# Patient Record
Sex: FEMALE | Race: BLACK OR AFRICAN AMERICAN | NOT HISPANIC OR LATINO | URBAN - METROPOLITAN AREA
[De-identification: names, ages, dates, MRNs, and addresses within clinical notes are randomized per-mention and may not be internally consistent; named-entity substitution may affect disease eponyms.]

---

## 2024-09-30 ENCOUNTER — EMERGENCY (EMERGENCY)
Facility: HOSPITAL | Age: 84
LOS: 1 days | Discharge: HOME | End: 2024-09-30
Attending: EMERGENCY MEDICINE
Payer: MEDICARE

## 2024-09-30 VITALS
OXYGEN SATURATION: 97 % | TEMPERATURE: 98 F | SYSTOLIC BLOOD PRESSURE: 213 MMHG | HEIGHT: 62 IN | WEIGHT: 125 LBS | HEART RATE: 91 BPM | DIASTOLIC BLOOD PRESSURE: 66 MMHG | RESPIRATION RATE: 18 BRPM

## 2024-09-30 PROCEDURE — 99285 EMERGENCY DEPT VISIT HI MDM: CPT

## 2024-09-30 PROCEDURE — 93010 ELECTROCARDIOGRAM REPORT: CPT

## 2024-09-30 PROCEDURE — 71045 X-RAY EXAM CHEST 1 VIEW: CPT | Mod: 26

## 2024-09-30 RX ORDER — HYDRALAZINE HCL 50 MG
50 TABLET ORAL ONCE
Refills: 0 | Status: COMPLETED | OUTPATIENT
Start: 2024-09-30 | End: 2024-09-30

## 2024-09-30 RX ADMIN — Medication 50 MILLIGRAM(S): at 23:54

## 2024-09-30 NOTE — ED ADULT TRIAGE NOTE - CHIEF COMPLAINT QUOTE
Pt reports that  her B/P has been high x1 week . she was in the ED last  week and started taking antihypertensives .  Today B/P high  again

## 2024-10-01 VITALS
OXYGEN SATURATION: 98 % | HEART RATE: 88 BPM | RESPIRATION RATE: 17 BRPM | TEMPERATURE: 98 F | SYSTOLIC BLOOD PRESSURE: 160 MMHG | DIASTOLIC BLOOD PRESSURE: 65 MMHG

## 2024-10-01 LAB
ALBUMIN SERPL ELPH-MCNC: 3.4 G/DL — LOW (ref 3.5–5)
ALP SERPL-CCNC: 119 U/L — SIGNIFICANT CHANGE UP (ref 40–120)
ALT FLD-CCNC: 30 U/L DA — SIGNIFICANT CHANGE UP (ref 10–60)
ANION GAP SERPL CALC-SCNC: 8 MMOL/L — SIGNIFICANT CHANGE UP (ref 5–17)
APPEARANCE UR: CLEAR — SIGNIFICANT CHANGE UP
AST SERPL-CCNC: 28 U/L — SIGNIFICANT CHANGE UP (ref 10–40)
BASOPHILS # BLD AUTO: 0.02 K/UL — SIGNIFICANT CHANGE UP (ref 0–0.2)
BASOPHILS NFR BLD AUTO: 0.3 % — SIGNIFICANT CHANGE UP (ref 0–2)
BILIRUB SERPL-MCNC: 0.4 MG/DL — SIGNIFICANT CHANGE UP (ref 0.2–1.2)
BILIRUB UR-MCNC: NEGATIVE — SIGNIFICANT CHANGE UP
BUN SERPL-MCNC: 22 MG/DL — HIGH (ref 7–18)
CALCIUM SERPL-MCNC: 8.3 MG/DL — LOW (ref 8.4–10.5)
CHLORIDE SERPL-SCNC: 112 MMOL/L — HIGH (ref 96–108)
CO2 SERPL-SCNC: 23 MMOL/L — SIGNIFICANT CHANGE UP (ref 22–31)
COLOR SPEC: YELLOW — SIGNIFICANT CHANGE UP
CREAT SERPL-MCNC: 1.58 MG/DL — HIGH (ref 0.5–1.3)
DIFF PNL FLD: NEGATIVE — SIGNIFICANT CHANGE UP
EGFR: 32 ML/MIN/1.73M2 — LOW
EOSINOPHIL # BLD AUTO: 0.11 K/UL — SIGNIFICANT CHANGE UP (ref 0–0.5)
EOSINOPHIL NFR BLD AUTO: 1.8 % — SIGNIFICANT CHANGE UP (ref 0–6)
GLUCOSE SERPL-MCNC: 111 MG/DL — HIGH (ref 70–99)
GLUCOSE UR QL: NEGATIVE MG/DL — SIGNIFICANT CHANGE UP
HCT VFR BLD CALC: 36.9 % — SIGNIFICANT CHANGE UP (ref 34.5–45)
HGB BLD-MCNC: 11.6 G/DL — SIGNIFICANT CHANGE UP (ref 11.5–15.5)
IMM GRANULOCYTES NFR BLD AUTO: 0 % — SIGNIFICANT CHANGE UP (ref 0–0.9)
KETONES UR-MCNC: NEGATIVE MG/DL — SIGNIFICANT CHANGE UP
LEUKOCYTE ESTERASE UR-ACNC: ABNORMAL
LYMPHOCYTES # BLD AUTO: 1.63 K/UL — SIGNIFICANT CHANGE UP (ref 1–3.3)
LYMPHOCYTES # BLD AUTO: 27.3 % — SIGNIFICANT CHANGE UP (ref 13–44)
MCHC RBC-ENTMCNC: 27.2 PG — SIGNIFICANT CHANGE UP (ref 27–34)
MCHC RBC-ENTMCNC: 31.4 GM/DL — LOW (ref 32–36)
MCV RBC AUTO: 86.6 FL — SIGNIFICANT CHANGE UP (ref 80–100)
MONOCYTES # BLD AUTO: 0.6 K/UL — SIGNIFICANT CHANGE UP (ref 0–0.9)
MONOCYTES NFR BLD AUTO: 10.1 % — SIGNIFICANT CHANGE UP (ref 2–14)
NEUTROPHILS # BLD AUTO: 3.61 K/UL — SIGNIFICANT CHANGE UP (ref 1.8–7.4)
NEUTROPHILS NFR BLD AUTO: 60.5 % — SIGNIFICANT CHANGE UP (ref 43–77)
NITRITE UR-MCNC: NEGATIVE — SIGNIFICANT CHANGE UP
NRBC # BLD: 0 /100 WBCS — SIGNIFICANT CHANGE UP (ref 0–0)
PH UR: 5.5 — SIGNIFICANT CHANGE UP (ref 5–8)
PLATELET # BLD AUTO: 266 K/UL — SIGNIFICANT CHANGE UP (ref 150–400)
POTASSIUM SERPL-MCNC: 4.7 MMOL/L — SIGNIFICANT CHANGE UP (ref 3.5–5.3)
POTASSIUM SERPL-SCNC: 4.7 MMOL/L — SIGNIFICANT CHANGE UP (ref 3.5–5.3)
PROT SERPL-MCNC: 6.8 G/DL — SIGNIFICANT CHANGE UP (ref 6–8.3)
PROT UR-MCNC: NEGATIVE MG/DL — SIGNIFICANT CHANGE UP
RBC # BLD: 4.26 M/UL — SIGNIFICANT CHANGE UP (ref 3.8–5.2)
RBC # FLD: 13.6 % — SIGNIFICANT CHANGE UP (ref 10.3–14.5)
SODIUM SERPL-SCNC: 143 MMOL/L — SIGNIFICANT CHANGE UP (ref 135–145)
SP GR SPEC: 1.01 — SIGNIFICANT CHANGE UP (ref 1–1.03)
TROPONIN I, HIGH SENSITIVITY RESULT: 22.9 NG/L — SIGNIFICANT CHANGE UP
UROBILINOGEN FLD QL: 0.2 MG/DL — SIGNIFICANT CHANGE UP (ref 0.2–1)
WBC # BLD: 5.97 K/UL — SIGNIFICANT CHANGE UP (ref 3.8–10.5)
WBC # FLD AUTO: 5.97 K/UL — SIGNIFICANT CHANGE UP (ref 3.8–10.5)

## 2024-10-01 PROCEDURE — 80053 COMPREHEN METABOLIC PANEL: CPT

## 2024-10-01 PROCEDURE — 85025 COMPLETE CBC W/AUTO DIFF WBC: CPT

## 2024-10-01 PROCEDURE — 81001 URINALYSIS AUTO W/SCOPE: CPT

## 2024-10-01 PROCEDURE — 87086 URINE CULTURE/COLONY COUNT: CPT

## 2024-10-01 PROCEDURE — 99285 EMERGENCY DEPT VISIT HI MDM: CPT | Mod: 25

## 2024-10-01 PROCEDURE — 71045 X-RAY EXAM CHEST 1 VIEW: CPT

## 2024-10-01 PROCEDURE — 36415 COLL VENOUS BLD VENIPUNCTURE: CPT

## 2024-10-01 PROCEDURE — 84484 ASSAY OF TROPONIN QUANT: CPT

## 2024-10-01 PROCEDURE — 93005 ELECTROCARDIOGRAM TRACING: CPT

## 2024-10-01 NOTE — ED PROVIDER NOTE - NSFOLLOWUPCLINICS_GEN_ALL_ED_FT
[facility]  Internal Medicine  [address] [place] [address]  Phone: [phone]  Fax: [phone]

## 2024-10-01 NOTE — ED PROVIDER NOTE - NSFOLLOWUPINSTRUCTIONS_ED_ALL_ED_FT
Hypertension is high blood pressure. Your blood pressure is the force of your blood moving against the walls of your arteries. Hypertension causes your blood pressure to get so high that your heart has to work much harder than normal. This can damage your heart. Hypertension that does not respond to medicines and lifestyle changes is called resistant hypertension. Hypertension is considered chronic when it continues for 3 months or longer.    Signs and symptoms of hypertension: You may have no signs or symptoms, or you may have any of the following:    Headache    Blurred vision    Chest pain    Dizziness or weakness    Trouble breathing    Nosebleeds  Call your local emergency number (911 in the ) or have someone call if:    You have chest pain.    You have any of the following signs of a heart attack:  Squeezing, pressure, or pain in your chest    You may also have any of the following:  Discomfort or pain in your back, neck, jaw, stomach, or arm    Shortness of breath    Nausea or vomiting    Lightheadedness or a sudden cold sweat    You become confused or have trouble speaking.    You suddenly feel lightheaded or have trouble breathing.  Seek care immediately if:    You have a severe headache or vision loss.    You have weakness in an arm or leg.  Call your doctor or cardiologist if:    You feel faint, dizzy, confused, or drowsy.    You have been taking your blood pressure medicine but your pressure is higher than your provider says it should be.    You have questions or concerns about your condition or care.  What you need to know about the stages of hypertension: Your healthcare provider will give you a blood pressure goal based on your age, health, and risk for cardiovascular disease. The following are general guidelines on the stages of hypertension:    Normal blood pressure is 119/79 or lower. Your healthcare provider may only check your blood pressure each year if it stays at a normal level.    Elevated blood pressure is 120/79 to 129/79. This is sometimes called prehypertension. Your healthcare provider may suggest lifestyle changes to help lower your blood pressure to a normal level. He or she may then check it again in 3 to 6 months.    Stage 1 hypertension is 130/80 to 139/89. Your provider may recommend lifestyle changes, medication, and checks every 3 to 6 months until your blood pressure is controlled.    Stage 2 hypertension is 140/90 or higher. Your provider will recommend lifestyle changes and have you take 2 kinds of hypertension medicines. You will also need to have your blood pressure checked monthly until it is controlled.  Blood Pressure Readings  Treatment may include any of the following:    Antihypertensives may be used to help lower your blood pressure. Several kinds of medicines are available. Your healthcare provider will choose medicines based on the kind of hypertension you have. You may need more than one type of medicine. Take the medicine exactly as directed.    Diuretics help decrease extra fluid that collects in your body. This will help lower your blood pressure. You may urinate more often while you take this medicine.    Cholesterol medicine helps lower your cholesterol level. A low cholesterol level helps prevent heart disease and makes it easier to control your blood pressure.  Manage hypertension:    Check your blood pressure at home. Do not smoke, have caffeine, or exercise for at least 30 minutes before you check your blood pressure. Sit and rest for 5 minutes before you check your blood pressure. Extend your arm and support it on a flat surface. Your arm should be at the same level as your heart. Follow the directions that came with your blood pressure monitor. Check your blood pressure 2 times, 1 minute apart, before you take your medicine in the morning. Also check your blood pressure before your evening meal. Keep a record of your readings and bring it to your follow-up visits. Ask your healthcare provider what your blood pressure should be.  How to take a Blood Pressure      Manage any other health conditions you have. Health conditions such as diabetes can increase your risk for hypertension. Follow your healthcare provider's instructions and take all your medicines as directed.    Ask about all medicines. Certain medicines can increase your blood pressure. Examples include oral birth control pills, decongestants, herbal supplements, and NSAIDs, such as ibuprofen. Your healthcare provider can tell you which medicines are safe for you to take. This includes prescription and over-the-counter medicines.  Lifestyle changes you can make to manage hypertension: Your healthcare provider may recommend you work with a team to manage hypertension. The team may include medical experts such as a dietitian, an exercise or physical therapist, and a behavior therapist. Your family members may be included in helping you create lifestyle changes.  Ways to Lower Your Blood Pressure    Limit sodium (salt) as directed. Too much sodium can affect your fluid balance. Check labels to find low-sodium or no-salt-added foods. Some low-sodium foods use potassium salts for flavor. Too much potassium can also cause health problems. Your healthcare provider will tell you how much sodium and potassium are safe for you to have in a day. He or she may recommend that you limit sodium to 2,300 mg a day.        Follow the meal plan recommended by your healthcare provider. A dietitian or your provider can give you more information on low-sodium plans or the DASH (Dietary Approaches to Stop Hypertension) eating plan. The DASH plan is low in sodium, processed sugar, unhealthy fats, and total fat. It is high in potassium, calcium, and fiber. These can be found in vegetables, fruit, and whole-grain foods.        Be physically active throughout the day. Physical activity, such as exercise, can help control your blood pressure and your weight. Be physically active for at least 30 minutes per day, on most days of the week. Include aerobic activity, such as walking or riding a bicycle. Also include strength training at least 2 times each week. Your healthcare providers can help you create a physical activity plan.  Ways to Be Physically Active  Strength Training for Adults      Decrease stress. This may help lower your blood pressure. Learn ways to relax, such as deep breathing or listening to music.    Limit alcohol as directed. Alcohol can increase your blood pressure. A drink of alcohol is 12 ounces of beer, 5 ounces of wine, or 1½ ounces of liquor.    Do not smoke. Nicotine and other chemicals in cigarettes and cigars can increase your blood pressure and also cause lung damage. Ask your healthcare provider for information if you currently smoke and need help to quit. E-cigarettes or smokeless tobacco still contain nicotine. Talk to your healthcare provider before you use these products.  Prevent Heart Disease   Follow up with your doctor or cardiologist as directed: You will need to return to have your blood pressure checked and to have other lab tests done. Write down your questions so you remember to ask them during your visits.

## 2024-10-01 NOTE — ED PROVIDER NOTE - PATIENT PORTAL LINK FT
You can access the FollowMyHealth Patient Portal offered by [facility] by registering at the following website: [email or web address]. By joining [facility]’s FollowMyHealth portal, you will also be able to view your health information using other applications (apps) compatible with our system.

## 2024-10-01 NOTE — ED PROVIDER NOTE - PHYSICAL EXAMINATION
No distress  Kernig and Brudzinski signs area negative, no petechiae, no photophobia, no dysarthria, no facial asymmetry, no focal deficits.   NIH stroke scale is zero. Pt passed dysphagia screen.   +1 LE edema.

## 2024-10-01 NOTE — ED PROVIDER NOTE - CLINICAL SUMMARY MEDICAL DECISION MAKING FREE TEXT BOX
84-year-old female with hypertension, triage /66.  Patient given p.o. hydralazine 50 mg.  At 1:10 AM blood pressure is currently 160/55.  Patient is asymptomatic.  Patient will continue with her prescribed antihypertensive medications and follow-up with PMD.  Pt is well appearing, has no new complaints and able to walk with normal gait. Pt is stable for discharge and follow up with medical doctor. Pt educated on care and need for follow up. Discussed anticipatory guidance and return precautions. Questions answered. I had a detailed discussion with the patient regarding the historical points, exam findings, and any diagnostic results supporting the discharge diagnosis.

## 2024-10-01 NOTE — ED PROVIDER NOTE - OBJECTIVE STATEMENT
84-year-old female patient states generalized weakness and checked her blood pressure prior to ED arrival recording 188 systolic blood pressure.  Patient denies any acute vision changes, no neck pain, no motor weakness, no LOC.  Patient's medications include: Plavix, Entresto, Ecotrin, hydralazine, nifedipine, Lipitor.  Patient states she is compliant with her antihypertensive medications and started nifedipine approximately 1 week ago.  Patient noted bilateral lower extremity mild swelling since starting nifedipine.

## 2024-10-08 ENCOUNTER — APPOINTMENT (OUTPATIENT)
Dept: INTERNAL MEDICINE | Facility: CLINIC | Age: 84
End: 2024-10-08

## 2024-10-08 ENCOUNTER — NON-APPOINTMENT (OUTPATIENT)
Age: 84
End: 2024-10-08

## 2024-10-08 VITALS
HEART RATE: 87 BPM | DIASTOLIC BLOOD PRESSURE: 53 MMHG | TEMPERATURE: 97.3 F | HEIGHT: 62 IN | WEIGHT: 132 LBS | BODY MASS INDEX: 24.29 KG/M2 | SYSTOLIC BLOOD PRESSURE: 141 MMHG

## 2024-10-08 VITALS — HEART RATE: 79 BPM | SYSTOLIC BLOOD PRESSURE: 133 MMHG | DIASTOLIC BLOOD PRESSURE: 54 MMHG

## 2024-10-08 DIAGNOSIS — Z00.00 ENCOUNTER FOR GENERAL ADULT MEDICAL EXAMINATION W/OUT ABNORMAL FINDINGS: ICD-10-CM

## 2024-10-08 DIAGNOSIS — I25.2 OLD MYOCARDIAL INFARCTION: ICD-10-CM

## 2024-10-08 DIAGNOSIS — I10 ESSENTIAL (PRIMARY) HYPERTENSION: ICD-10-CM

## 2024-10-08 DIAGNOSIS — D64.9 ANEMIA, UNSPECIFIED: ICD-10-CM

## 2024-10-08 PROCEDURE — G0438: CPT

## 2024-10-08 PROCEDURE — 99387 INIT PM E/M NEW PAT 65+ YRS: CPT | Mod: GY

## 2024-10-08 PROCEDURE — 99204 OFFICE O/P NEW MOD 45 MIN: CPT | Mod: 25

## 2024-10-08 PROCEDURE — 36415 COLL VENOUS BLD VENIPUNCTURE: CPT

## 2024-10-08 RX ORDER — NIFEDIPINE 30 MG/1
30 TABLET, EXTENDED RELEASE ORAL
Refills: 0 | Status: ACTIVE | COMMUNITY

## 2024-10-08 RX ORDER — SACUBITRIL AND VALSARTAN 49; 51 MG/1; MG/1
TABLET, FILM COATED ORAL
Refills: 0 | Status: ACTIVE | COMMUNITY

## 2024-10-08 RX ORDER — CLOZAPINE 200 MG/1
TABLET ORAL
Refills: 0 | Status: ACTIVE | COMMUNITY

## 2024-10-08 RX ORDER — HYDRALAZINE HYDROCHLORIDE 50 MG/1
50 TABLET ORAL
Refills: 0 | Status: ACTIVE | COMMUNITY

## 2024-10-09 PROBLEM — D64.9 NORMOCYTIC ANEMIA: Status: ACTIVE | Noted: 2024-10-09

## 2024-10-09 PROBLEM — I10 HYPERTENSION: Status: ACTIVE | Noted: 2024-10-09

## 2024-10-10 ENCOUNTER — NON-APPOINTMENT (OUTPATIENT)
Age: 84
End: 2024-10-10

## 2024-10-10 LAB
ALBUMIN SERPL ELPH-MCNC: 4.1 G/DL
ALP BLD-CCNC: 113 U/L
ALT SERPL-CCNC: 22 U/L
ANION GAP SERPL CALC-SCNC: 14 MMOL/L
AST SERPL-CCNC: 26 U/L
BILIRUB SERPL-MCNC: 0.4 MG/DL
BUN SERPL-MCNC: 17 MG/DL
CALCIUM SERPL-MCNC: 8.8 MG/DL
CHLORIDE SERPL-SCNC: 104 MMOL/L
CHOLEST SERPL-MCNC: 113 MG/DL
CO2 SERPL-SCNC: 23 MMOL/L
CREAT SERPL-MCNC: 1.51 MG/DL
EGFR: 34 ML/MIN/1.73M2
ESTIMATED AVERAGE GLUCOSE: 111 MG/DL
GLUCOSE SERPL-MCNC: 112 MG/DL
HBA1C MFR BLD HPLC: 5.5 %
HCT VFR BLD CALC: 33.3 %
HDLC SERPL-MCNC: 61 MG/DL
HGB BLD-MCNC: 10.5 G/DL
LDLC SERPL CALC-MCNC: 40 MG/DL
MCHC RBC-ENTMCNC: 27.1 PG
MCHC RBC-ENTMCNC: 31.5 GM/DL
MCV RBC AUTO: 86 FL
NONHDLC SERPL-MCNC: 52 MG/DL
PLATELET # BLD AUTO: 291 K/UL
POTASSIUM SERPL-SCNC: 4.1 MMOL/L
PROT SERPL-MCNC: 6.7 G/DL
RBC # BLD: 3.87 M/UL
RBC # FLD: 13.7 %
SODIUM SERPL-SCNC: 141 MMOL/L
TRIGL SERPL-MCNC: 50 MG/DL
WBC # FLD AUTO: 5.77 K/UL

## 2024-10-12 PROBLEM — E78.5 HYPERLIPIDEMIA, UNSPECIFIED: Chronic | Status: ACTIVE | Noted: 2024-10-01

## 2024-10-12 PROBLEM — I25.10 ATHEROSCLEROTIC HEART DISEASE OF NATIVE CORONARY ARTERY WITHOUT ANGINA PECTORIS: Chronic | Status: ACTIVE | Noted: 2024-10-01

## 2024-10-12 PROBLEM — I10 ESSENTIAL (PRIMARY) HYPERTENSION: Chronic | Status: ACTIVE | Noted: 2024-10-01

## 2024-10-12 LAB
FERRITIN SERPL-MCNC: 98 NG/ML
FOLATE SERPL-MCNC: 6.2 NG/ML
IRON SATN MFR SERPL: 10 %
IRON SERPL-MCNC: 32 UG/DL
TIBC SERPL-MCNC: 310 UG/DL
UIBC SERPL-MCNC: 278 UG/DL
VIT B12 SERPL-MCNC: 678 PG/ML

## 2024-10-14 ENCOUNTER — INPATIENT (INPATIENT)
Facility: HOSPITAL | Age: 84
LOS: 3 days | Discharge: HOME | DRG: 311 | End: 2024-10-18
Attending: INTERNAL MEDICINE | Admitting: INTERNAL MEDICINE
Payer: MEDICARE

## 2024-10-14 VITALS
HEIGHT: 62 IN | WEIGHT: 133.82 LBS | HEART RATE: 68 BPM | TEMPERATURE: 98 F | RESPIRATION RATE: 18 BRPM | SYSTOLIC BLOOD PRESSURE: 180 MMHG | DIASTOLIC BLOOD PRESSURE: 76 MMHG | OXYGEN SATURATION: 97 %

## 2024-10-14 LAB
ALBUMIN SERPL ELPH-MCNC: 3.1 G/DL — LOW (ref 3.5–5)
ALP SERPL-CCNC: 108 U/L — SIGNIFICANT CHANGE UP (ref 40–120)
ALT FLD-CCNC: 31 U/L DA — SIGNIFICANT CHANGE UP (ref 10–60)
ANION GAP SERPL CALC-SCNC: 4 MMOL/L — LOW (ref 5–17)
APPEARANCE UR: CLEAR — SIGNIFICANT CHANGE UP
AST SERPL-CCNC: 24 U/L — SIGNIFICANT CHANGE UP (ref 10–40)
BASOPHILS # BLD AUTO: 0.01 K/UL — SIGNIFICANT CHANGE UP (ref 0–0.2)
BASOPHILS NFR BLD AUTO: 0.2 % — SIGNIFICANT CHANGE UP (ref 0–2)
BILIRUB SERPL-MCNC: 0.4 MG/DL — SIGNIFICANT CHANGE UP (ref 0.2–1.2)
BILIRUB UR-MCNC: NEGATIVE — SIGNIFICANT CHANGE UP
BUN SERPL-MCNC: 20 MG/DL — HIGH (ref 7–18)
CALCIUM SERPL-MCNC: 8.4 MG/DL — SIGNIFICANT CHANGE UP (ref 8.4–10.5)
CHLORIDE SERPL-SCNC: 114 MMOL/L — HIGH (ref 96–108)
CK SERPL-CCNC: 121 U/L — SIGNIFICANT CHANGE UP (ref 21–215)
CO2 SERPL-SCNC: 25 MMOL/L — SIGNIFICANT CHANGE UP (ref 22–31)
COLOR SPEC: YELLOW — SIGNIFICANT CHANGE UP
CREAT SERPL-MCNC: 1.66 MG/DL — HIGH (ref 0.5–1.3)
DIFF PNL FLD: NEGATIVE — SIGNIFICANT CHANGE UP
EGFR: 30 ML/MIN/1.73M2 — LOW
EOSINOPHIL # BLD AUTO: 0.12 K/UL — SIGNIFICANT CHANGE UP (ref 0–0.5)
EOSINOPHIL NFR BLD AUTO: 2.3 % — SIGNIFICANT CHANGE UP (ref 0–6)
GLUCOSE SERPL-MCNC: 101 MG/DL — HIGH (ref 70–99)
GLUCOSE UR QL: NEGATIVE MG/DL — SIGNIFICANT CHANGE UP
HCT VFR BLD CALC: 29.4 % — LOW (ref 34.5–45)
HGB BLD-MCNC: 9.4 G/DL — LOW (ref 11.5–15.5)
IMM GRANULOCYTES NFR BLD AUTO: 0.2 % — SIGNIFICANT CHANGE UP (ref 0–0.9)
KETONES UR-MCNC: NEGATIVE MG/DL — SIGNIFICANT CHANGE UP
LEUKOCYTE ESTERASE UR-ACNC: NEGATIVE — SIGNIFICANT CHANGE UP
LYMPHOCYTES # BLD AUTO: 1.3 K/UL — SIGNIFICANT CHANGE UP (ref 1–3.3)
LYMPHOCYTES # BLD AUTO: 25.4 % — SIGNIFICANT CHANGE UP (ref 13–44)
MAGNESIUM SERPL-MCNC: 2.1 MG/DL — SIGNIFICANT CHANGE UP (ref 1.6–2.6)
MCHC RBC-ENTMCNC: 27.1 PG — SIGNIFICANT CHANGE UP (ref 27–34)
MCHC RBC-ENTMCNC: 32 GM/DL — SIGNIFICANT CHANGE UP (ref 32–36)
MCV RBC AUTO: 84.7 FL — SIGNIFICANT CHANGE UP (ref 80–100)
MONOCYTES # BLD AUTO: 0.47 K/UL — SIGNIFICANT CHANGE UP (ref 0–0.9)
MONOCYTES NFR BLD AUTO: 9.2 % — SIGNIFICANT CHANGE UP (ref 2–14)
NEUTROPHILS # BLD AUTO: 3.2 K/UL — SIGNIFICANT CHANGE UP (ref 1.8–7.4)
NEUTROPHILS NFR BLD AUTO: 62.7 % — SIGNIFICANT CHANGE UP (ref 43–77)
NITRITE UR-MCNC: NEGATIVE — SIGNIFICANT CHANGE UP
NRBC # BLD: 0 /100 WBCS — SIGNIFICANT CHANGE UP (ref 0–0)
NT-PROBNP SERPL-SCNC: 1543 PG/ML — HIGH (ref 0–450)
PH UR: 7 — SIGNIFICANT CHANGE UP (ref 5–8)
PLATELET # BLD AUTO: 262 K/UL — SIGNIFICANT CHANGE UP (ref 150–400)
POTASSIUM SERPL-MCNC: 4 MMOL/L — SIGNIFICANT CHANGE UP (ref 3.5–5.3)
POTASSIUM SERPL-SCNC: 4 MMOL/L — SIGNIFICANT CHANGE UP (ref 3.5–5.3)
PROT SERPL-MCNC: 6.2 G/DL — SIGNIFICANT CHANGE UP (ref 6–8.3)
PROT UR-MCNC: NEGATIVE MG/DL — SIGNIFICANT CHANGE UP
RBC # BLD: 3.47 M/UL — LOW (ref 3.8–5.2)
RBC # FLD: 13.4 % — SIGNIFICANT CHANGE UP (ref 10.3–14.5)
SODIUM SERPL-SCNC: 143 MMOL/L — SIGNIFICANT CHANGE UP (ref 135–145)
SP GR SPEC: 1 — LOW (ref 1–1.03)
TROPONIN I, HIGH SENSITIVITY RESULT: 70 NG/L — HIGH
UROBILINOGEN FLD QL: 0.2 MG/DL — SIGNIFICANT CHANGE UP (ref 0.2–1)
WBC # BLD: 5.11 K/UL — SIGNIFICANT CHANGE UP (ref 3.8–10.5)
WBC # FLD AUTO: 5.11 K/UL — SIGNIFICANT CHANGE UP (ref 3.8–10.5)

## 2024-10-14 PROCEDURE — 71045 X-RAY EXAM CHEST 1 VIEW: CPT | Mod: 26

## 2024-10-14 PROCEDURE — 93010 ELECTROCARDIOGRAM REPORT: CPT

## 2024-10-14 PROCEDURE — 99285 EMERGENCY DEPT VISIT HI MDM: CPT

## 2024-10-14 RX ORDER — ASPIRIN 325 MG
162 TABLET ORAL ONCE
Refills: 0 | Status: COMPLETED | OUTPATIENT
Start: 2024-10-14 | End: 2024-10-14

## 2024-10-14 NOTE — ED ADULT NURSE NOTE - NS ED NURSE TRANSPORT WITH
Cardiac Monitor/Defib/ACLS/Rescue Kit/O2/BVM/ACLS Rescue Kit RN/Cardiac Monitor/Defib/ACLS/Rescue Kit/O2/BVM/ACLS Rescue Kit

## 2024-10-14 NOTE — ED PROVIDER NOTE - MUSCULOSKELETAL, MLM
Spine appears normal, range of motion is not limited, no muscle or joint tenderness, no CVAT, LLE>>RLE with pitting edema

## 2024-10-14 NOTE — ED PROVIDER NOTE - CLINICAL SUMMARY MEDICAL DECISION MAKING FREE TEXT BOX
84-year-old female with history of CHF, CAD, complaining of leg edema for past 4 days, dyspnea on exertion, lightheadedness.  Patient has had nifedipine and hydralazine added to his hypertensive regimen, this can attribute to patient's leg edema.  Will get labs, BNP, chest x-ray to rule out CHF.  Also concern for ACS,

## 2024-10-14 NOTE — ED PROVIDER NOTE - OBJECTIVE STATEMENT
84-year-old female with history of HTN, CAD with 1 vessel stent on 10/18/2023, CHF on Entresto, allergic to penicillin with hives.  Patient complaining of bilateral legs swelling for past 4 days, worse left leg, mild pain/numbness, mild SOB, lightheadedness, ABDALLA for 1 months.  In the past, patient claims she is able to ambulate 10 blocks with no dyspnea. she denies palpitation, fever.  Patient endorsed this all happened after her physician added hydralazine 50 mg and nifedipine 30 mg 2 weeks ago to her hypertensive regimen.  Patient went for follow-up yesterday, her physician canceled the appointment due to sudden emergency.  Patient has a next appointment on October 22

## 2024-10-14 NOTE — ED PROVIDER NOTE - PROGRESS NOTE DETAILS
labs/CXR explained to patient and son   2 sets of troponins slightly elevated but no significant changes.    patient was seen in ED on 10/1, hemoglobin was 11.6, today at 9.4.  She denies abnormal bleeding   troponin 22.9 on 10/1, today 70   will admit patient Case discussed with hospitalist Dr. Birch

## 2024-10-14 NOTE — ED PROVIDER NOTE - CARE PLAN
Principal Discharge DX:	ACS (acute coronary syndrome)  Secondary Diagnosis:	Anemia  Secondary Diagnosis:	Edema leg   1 Principal Discharge DX:	Elevated troponin level  Secondary Diagnosis:	Anemia  Secondary Diagnosis:	Edema leg

## 2024-10-15 ENCOUNTER — RESULT REVIEW (OUTPATIENT)
Age: 84
End: 2024-10-15

## 2024-10-15 DIAGNOSIS — D64.9 ANEMIA, UNSPECIFIED: ICD-10-CM

## 2024-10-15 DIAGNOSIS — I10 ESSENTIAL (PRIMARY) HYPERTENSION: ICD-10-CM

## 2024-10-15 DIAGNOSIS — I50.9 HEART FAILURE, UNSPECIFIED: ICD-10-CM

## 2024-10-15 DIAGNOSIS — I24.9 ACUTE ISCHEMIC HEART DISEASE, UNSPECIFIED: ICD-10-CM

## 2024-10-15 DIAGNOSIS — R60.0 LOCALIZED EDEMA: ICD-10-CM

## 2024-10-15 DIAGNOSIS — N17.9 ACUTE KIDNEY FAILURE, UNSPECIFIED: ICD-10-CM

## 2024-10-15 DIAGNOSIS — N28.9 DISORDER OF KIDNEY AND URETER, UNSPECIFIED: ICD-10-CM

## 2024-10-15 DIAGNOSIS — Z29.9 ENCOUNTER FOR PROPHYLACTIC MEASURES, UNSPECIFIED: ICD-10-CM

## 2024-10-15 DIAGNOSIS — I25.10 ATHEROSCLEROTIC HEART DISEASE OF NATIVE CORONARY ARTERY WITHOUT ANGINA PECTORIS: ICD-10-CM

## 2024-10-15 DIAGNOSIS — R09.89 OTHER SPECIFIED SYMPTOMS AND SIGNS INVOLVING THE CIRCULATORY AND RESPIRATORY SYSTEMS: ICD-10-CM

## 2024-10-15 LAB
ALBUMIN SERPL ELPH-MCNC: 3.3 G/DL — LOW (ref 3.5–5)
ALP SERPL-CCNC: 101 U/L — SIGNIFICANT CHANGE UP (ref 40–120)
ALT FLD-CCNC: 29 U/L DA — SIGNIFICANT CHANGE UP (ref 10–60)
ANION GAP SERPL CALC-SCNC: 4 MMOL/L — LOW (ref 5–17)
APTT BLD: 26.8 SEC — SIGNIFICANT CHANGE UP (ref 24.5–35.6)
AST SERPL-CCNC: 25 U/L — SIGNIFICANT CHANGE UP (ref 10–40)
BASOPHILS # BLD AUTO: 0.02 K/UL — SIGNIFICANT CHANGE UP (ref 0–0.2)
BASOPHILS NFR BLD AUTO: 0.4 % — SIGNIFICANT CHANGE UP (ref 0–2)
BILIRUB SERPL-MCNC: 0.4 MG/DL — SIGNIFICANT CHANGE UP (ref 0.2–1.2)
BUN SERPL-MCNC: 19 MG/DL — HIGH (ref 7–18)
CALCIUM SERPL-MCNC: 8.2 MG/DL — LOW (ref 8.4–10.5)
CHLORIDE SERPL-SCNC: 112 MMOL/L — HIGH (ref 96–108)
CO2 SERPL-SCNC: 29 MMOL/L — SIGNIFICANT CHANGE UP (ref 22–31)
CREAT SERPL-MCNC: 1.49 MG/DL — HIGH (ref 0.5–1.3)
D DIMER BLD IA.RAPID-MCNC: 433 NG/ML DDU — HIGH
EGFR: 34 ML/MIN/1.73M2 — LOW
EOSINOPHIL # BLD AUTO: 0.19 K/UL — SIGNIFICANT CHANGE UP (ref 0–0.5)
EOSINOPHIL NFR BLD AUTO: 3.8 % — SIGNIFICANT CHANGE UP (ref 0–6)
FIBRINOGEN PPP-MCNC: 304 MG/DL — SIGNIFICANT CHANGE UP (ref 200–475)
FOLATE SERPL-MCNC: 5.9 NG/ML — SIGNIFICANT CHANGE UP
GLUCOSE SERPL-MCNC: 98 MG/DL — SIGNIFICANT CHANGE UP (ref 70–99)
HAPTOGLOB SERPL-MCNC: 87 MG/DL — SIGNIFICANT CHANGE UP (ref 34–200)
HCT VFR BLD CALC: 30.9 % — LOW (ref 34.5–45)
HGB BLD-MCNC: 9.8 G/DL — LOW (ref 11.5–15.5)
IMM GRANULOCYTES NFR BLD AUTO: 0.2 % — SIGNIFICANT CHANGE UP (ref 0–0.9)
INR BLD: 0.97 RATIO — SIGNIFICANT CHANGE UP (ref 0.85–1.16)
IRON SATN MFR SERPL: 16 % — SIGNIFICANT CHANGE UP (ref 15–50)
IRON SATN MFR SERPL: 44 UG/DL — SIGNIFICANT CHANGE UP (ref 40–150)
LDH SERPL L TO P-CCNC: 252 U/L — HIGH (ref 120–225)
LYMPHOCYTES # BLD AUTO: 1.14 K/UL — SIGNIFICANT CHANGE UP (ref 1–3.3)
LYMPHOCYTES # BLD AUTO: 22.5 % — SIGNIFICANT CHANGE UP (ref 13–44)
MAGNESIUM SERPL-MCNC: 2.1 MG/DL — SIGNIFICANT CHANGE UP (ref 1.6–2.6)
MCHC RBC-ENTMCNC: 26.9 PG — LOW (ref 27–34)
MCHC RBC-ENTMCNC: 31.7 GM/DL — LOW (ref 32–36)
MCV RBC AUTO: 84.9 FL — SIGNIFICANT CHANGE UP (ref 80–100)
MONOCYTES # BLD AUTO: 0.51 K/UL — SIGNIFICANT CHANGE UP (ref 0–0.9)
MONOCYTES NFR BLD AUTO: 10.1 % — SIGNIFICANT CHANGE UP (ref 2–14)
NEUTROPHILS # BLD AUTO: 3.19 K/UL — SIGNIFICANT CHANGE UP (ref 1.8–7.4)
NEUTROPHILS NFR BLD AUTO: 63 % — SIGNIFICANT CHANGE UP (ref 43–77)
NRBC # BLD: 0 /100 WBCS — SIGNIFICANT CHANGE UP (ref 0–0)
PHOSPHATE SERPL-MCNC: 3.1 MG/DL — SIGNIFICANT CHANGE UP (ref 2.5–4.5)
PLATELET # BLD AUTO: 260 K/UL — SIGNIFICANT CHANGE UP (ref 150–400)
POTASSIUM SERPL-MCNC: 3.7 MMOL/L — SIGNIFICANT CHANGE UP (ref 3.5–5.3)
POTASSIUM SERPL-SCNC: 3.7 MMOL/L — SIGNIFICANT CHANGE UP (ref 3.5–5.3)
PROT SERPL-MCNC: 6.4 G/DL — SIGNIFICANT CHANGE UP (ref 6–8.3)
PROTHROM AB SERPL-ACNC: 11.4 SEC — SIGNIFICANT CHANGE UP (ref 9.9–13.4)
RBC # BLD: 3.64 M/UL — LOW (ref 3.8–5.2)
RBC # FLD: 13.5 % — SIGNIFICANT CHANGE UP (ref 10.3–14.5)
RETICS #: 35.1 K/UL — SIGNIFICANT CHANGE UP (ref 25–125)
RETICS/RBC NFR: 1 % — SIGNIFICANT CHANGE UP (ref 0.5–2.5)
SODIUM SERPL-SCNC: 145 MMOL/L — SIGNIFICANT CHANGE UP (ref 135–145)
TIBC SERPL-MCNC: 274 UG/DL — SIGNIFICANT CHANGE UP (ref 250–450)
TROPONIN I, HIGH SENSITIVITY RESULT: 53.5 NG/L — SIGNIFICANT CHANGE UP
TROPONIN I, HIGH SENSITIVITY RESULT: 72.6 NG/L — HIGH
UIBC SERPL-MCNC: 230 UG/DL — SIGNIFICANT CHANGE UP (ref 110–370)
VIT B12 SERPL-MCNC: 610 PG/ML — SIGNIFICANT CHANGE UP (ref 232–1245)
WBC # BLD: 4.88 K/UL — SIGNIFICANT CHANGE UP (ref 3.8–10.5)
WBC # FLD AUTO: 4.88 K/UL — SIGNIFICANT CHANGE UP (ref 3.8–10.5)

## 2024-10-15 PROCEDURE — 93970 EXTREMITY STUDY: CPT | Mod: 26

## 2024-10-15 PROCEDURE — 78582 LUNG VENTILAT&PERFUS IMAGING: CPT | Mod: 26

## 2024-10-15 PROCEDURE — 99223 1ST HOSP IP/OBS HIGH 75: CPT

## 2024-10-15 RX ORDER — SACUBITRIL AND VALSARTAN 97; 103 MG/1; MG/1
1 TABLET, FILM COATED ORAL
Refills: 0 | DISCHARGE

## 2024-10-15 RX ORDER — HYDRALAZINE HYDROCHLORIDE 100 MG/1
50 TABLET ORAL
Refills: 0 | Status: DISCONTINUED | OUTPATIENT
Start: 2024-10-15 | End: 2024-10-15

## 2024-10-15 RX ORDER — FUROSEMIDE 10 MG/ML
40 INJECTION INTRAVENOUS ONCE
Refills: 0 | Status: COMPLETED | OUTPATIENT
Start: 2024-10-15 | End: 2024-10-15

## 2024-10-15 RX ORDER — ACETAMINOPHEN 325 MG
650 TABLET ORAL EVERY 6 HOURS
Refills: 0 | Status: DISCONTINUED | OUTPATIENT
Start: 2024-10-15 | End: 2024-10-18

## 2024-10-15 RX ORDER — SACUBITRIL AND VALSARTAN 97; 103 MG/1; MG/1
1 TABLET, FILM COATED ORAL
Refills: 0 | Status: DISCONTINUED | OUTPATIENT
Start: 2024-10-15 | End: 2024-10-17

## 2024-10-15 RX ORDER — ASPIRIN 325 MG
1 TABLET ORAL
Refills: 0 | DISCHARGE

## 2024-10-15 RX ORDER — ENOXAPARIN SODIUM 150 MG/ML
60 INJECTION SUBCUTANEOUS ONCE
Refills: 0 | Status: COMPLETED | OUTPATIENT
Start: 2024-10-15 | End: 2024-10-15

## 2024-10-15 RX ORDER — ASPIRIN 325 MG
81 TABLET ORAL DAILY
Refills: 0 | Status: DISCONTINUED | OUTPATIENT
Start: 2024-10-15 | End: 2024-10-18

## 2024-10-15 RX ORDER — CRANBERRY FRUIT EXTRACT 650 MG
1 CAPSULE ORAL
Refills: 0 | DISCHARGE

## 2024-10-15 RX ADMIN — Medication 81 MILLIGRAM(S): at 11:27

## 2024-10-15 RX ADMIN — ENOXAPARIN SODIUM 60 MILLIGRAM(S): 150 INJECTION SUBCUTANEOUS at 09:14

## 2024-10-15 RX ADMIN — Medication 75 MILLIGRAM(S): at 11:27

## 2024-10-15 RX ADMIN — SACUBITRIL AND VALSARTAN 1 TABLET(S): 97; 103 TABLET, FILM COATED ORAL at 17:25

## 2024-10-15 RX ADMIN — FUROSEMIDE 40 MILLIGRAM(S): 10 INJECTION INTRAVENOUS at 00:18

## 2024-10-15 RX ADMIN — Medication 5000 UNIT(S): at 21:07

## 2024-10-15 NOTE — H&P ADULT - PROBLEM SELECTOR PLAN 3
hx of CAD on plavix, asa  -p/w troponin 70-->70.2 increased from 22.9 on 10/1  -repeat troponin down trended to 53.5  -no acute ischemic changes on EKG  -admit to telemetry   -c/w home meds  -cardiology consult in AM hx of MI 10/2023 currently on GDMT with entresto  -CXR (wet read) grossly clear  -c/w home entresto  -s/p lasix 40mg IV in ED  -no respiratory distress, hold off on further diuresis for now  -Cardiology consult in AM  -f/u TTE --> check for signs of right heart strain

## 2024-10-15 NOTE — H&P ADULT - ASSESSMENT
84F with PMH of HTN, HLD, CAD, and CHF presenting with unilateral left lower extremity swelling and right posteroinferior flank pain. Admitted to telemetry for acute anemia, [place], and exclusion of acute DVT/PE.

## 2024-10-15 NOTE — H&P ADULT - ATTENDING COMMENTS
Vital Signs Last 24 Hrs  T(C): 36.8 (15 Oct 2024 00:30), Max: 36.9 (14 Oct 2024 18:10)  T(F): 98.2 (15 Oct 2024 00:30), Max: 98.4 (14 Oct 2024 18:10)  HR: 59 (15 Oct 2024 02:31) (54 - 68)  BP: 190/70 (15 Oct 2024 02:31) (180/76 - 192/73)  RR: 18 (15 Oct 2024 00:30) (18 - 18)  SpO2: 96% (15 Oct 2024 00:30) (96% - 97%)  Parameters below as of 15 Oct 2024 00:30  Patient On (Oxygen Delivery Method): room air    Labs reviewed  hgb 11.6 --> 9.4 ( needs repeat)  Elevated trop today stable at 70s X 2 assay  Elevated renal indices --> stable with gfr of 30  pro bnp 1543    Impression   84 year old woman with hx including HTN, CAD, CHF here with complaints of worsening dependent extremity swelling, exercise intolerance and exertional dyspnea for the last few weeks. No prior visits here and no imaging for comparisons.  Of note recent placement on dihydropyridine CCB for HTN - can explain worsening b/l LE edema but not exercise intolerance.    Plan   Admit to Medicine   Parenteral diuretics- Lasix 40mg daily   ECHO in AM   Continue home meds after med reconciliation  CHF diet   Elevated troponin likely reactive ( type II MI)- obtain one more assay in AM; EKG reviewed  repeat hgb levels  Check renal U/S Vital Signs Last 24 Hrs  T(C): 36.8 (15 Oct 2024 00:30), Max: 36.9 (14 Oct 2024 18:10)  T(F): 98.2 (15 Oct 2024 00:30), Max: 98.4 (14 Oct 2024 18:10)  HR: 59 (15 Oct 2024 02:31) (54 - 68)  BP: 190/70 (15 Oct 2024 02:31) (180/76 - 192/73)  RR: 18 (15 Oct 2024 00:30) (18 - 18)  SpO2: 96% (15 Oct 2024 00:30) (96% - 97%)  Parameters below as of 15 Oct 2024 00:30  Patient On (Oxygen Delivery Method): room air    Labs reviewed  hgb 11.6 --> 9.4 ( needs repeat)  Elevated trop today stable at 70s X 2 assay  Elevated renal indices --> stable with GFR of 30  pro BNP -  1543    Impression   84 year old woman with hx including HTN, CAD, CHF here with complaints of worsening dependent extremity swelling, exercise intolerance and exertional dyspnea for the last few weeks. No prior visits here and no imaging for comparisons.  She was diagnosed with CHF at [facility] in 2020 and has been on Entresto since then. She denies any swelling problems before now and no exercise limitation.  Of note recent placement on dihydropyridine CCB for HTN - can explain worsening b/l LE edema but not exercise intolerance.  Lung exam and CXR unremarkable and this is most likely an acute chf exacerbation most likely due to right heart failure.    Plan   Admit to Medicine   Parenteral diuretics- Lasix 40mg daily   ECHO in AM   Cardiology consult   Continue home meds after med reconciliation  CHF diet   Elevated troponin likely reactive ( type II MI )- obtain one more assay in AM; EKG reviewed  repeat hgb levels  Check renal U/S Vital Signs Last 24 Hrs  T(C): 36.8 (15 Oct 2024 00:30), Max: 36.9 (14 Oct 2024 18:10)  T(F): 98.2 (15 Oct 2024 00:30), Max: 98.4 (14 Oct 2024 18:10)  HR: 59 (15 Oct 2024 02:31) (54 - 68)  BP: 190/70 (15 Oct 2024 02:31) (180/76 - 192/73)  RR: 18 (15 Oct 2024 00:30) (18 - 18)  SpO2: 96% (15 Oct 2024 00:30) (96% - 97%)  Parameters below as of 15 Oct 2024 00:30  Patient On (Oxygen Delivery Method): room air    Labs reviewed  hgb 11.6 --> 9.4 ( needs repeat)  Elevated trop today stable at 70s X 2 assay  Elevated renal indices --> stable with GFR of 30  pro BNP -  1543    Impression   84 year old woman with hx including HTN, CAD, CHF here with complaints of worsening dependent extremity swelling, exercise intolerance and exertional dyspnea for the last few weeks. No prior visits here and no imaging for comparisons.  She was diagnosed with CHF at [facility] in 2020 and has been on Entresto since then- compliant. She denies any swelling problems before now and no exercise limitation.  Lung exam and CXR unremarkable for a left sided CHF.  The concern with her new symptoms especially the left leg being more swollen than the right  makes LLE DVT likely with concern for a PE as well considering her presenting symptoms.  Will give full dose LMWH -renally dosed - Lovenox 60mg sc now  Check for DVT both lower extremities  r/o PE  - V/Q scan due to poor renal function    Plan   Admit to Medicine   ECHO in AM   One dose of full dose Lovenox 60mg  DVT studies both LE  V/Q scan to r/o PE  Cardiology consult   Continue home meds after med reconciliation  CHF diet   Elevated troponin likely reactive ( type II MI )- obtain one more assay in AM; EKG reviewed  repeat hgb levels  Check renal U/S

## 2024-10-15 NOTE — H&P ADULT - PROBLEM SELECTOR PLAN 4
Pt w/ SCr 1.66 on admission  -Baseline SCr - unknown (1.58 on 10/1)  -likely CKD   -F/U Urine Lytes, calculate FeNa  -avoid nephrotoxins  -follow BMP daily hx of CAD s/p stent on plavix, asa  -p/w troponin 70-->70.2 increased from 22.9 on 10/1  -repeat troponin down trended to 53.5  -no acute ischemic changes on EKG  -admit to telemetry   -c/w home meds  -cardiology consult in AM

## 2024-10-15 NOTE — H&P ADULT - PROBLEM SELECTOR PLAN 1
p/w LLE leg swelling for 3 days  -+wilmar's sign, +erythema   -Wells score=1   -D-dimer 433 (normal for age)  -f/u LLE US doppler in s/o high clinical suspicion   -c/w ASA, Plavix  -start heparin subq DVT ppx p/w LLE leg swelling for 3 days  -+wilmar's sign, +erythema   -Wells score=1   -D-dimer 433 (normal for age)  -f/u LLE US doppler in s/o high clinical suspicion   -if positive f/u V/Q scan in s/o renal insufficiency  -c/w ASA, Plavix  -start FD lovenox for now due to high clinical suspicion

## 2024-10-15 NOTE — H&P ADULT - HISTORY OF PRESENT ILLNESS
84F, from home, ambulates independently, with PMH of HTN, HLD, CAD, and CHF presenting with unilateral left lower extremity swelling and right posteroinferior flank pain. Patient states that symptoms started approximately 3 days ago. She reports that initially, both legs were swollen, but the swelling has since become localized to the left side. Before the swelling became localized to the left leg and after both legs became swollen, the patient developed an acute onset of pain in her right side which has persisted and noticeable with deep breaths but obvious when she moves her torso side to side. Denies shortness of breath, palpitations, or lightheadednes. Denies dysuria however reports increased urinary frequency recently. Patient was seen in [facility] ED with basic labs sent on 10/1 for high blood pressure reading at home and per ED provider note on that visit patient reported mild bilateral leg swelling since starting nifedipine approximately 1 week prior. Patient was discharged home after BP control with home dose of nifedipine PO. Currently patient fever, chills, headache, dizziness, chest pain, palpitations, shortness of breath, abdominal pain, nausea, vomiting, diarrhea, constipation, and dysuria.

## 2024-10-15 NOTE — H&P ADULT - PROBLEM SELECTOR PLAN 2
p/w Hbg 9.4, acute drop from 11.6 on 10/21  -maintain active type/screen and 2 large bore IVs  -transfuse to maintain hbg >7  -f/u iron panel, b12, folate, haptoglobin, retic count, LDH

## 2024-10-15 NOTE — PATIENT PROFILE ADULT - FALL HARM RISK - HARM RISK INTERVENTIONS
Assistance with ambulation/Assistance OOB with selected safe patient handling equipment/Communicate Risk of Fall with Harm to all staff/Monitor gait and stability/Reinforce activity limits and safety measures with patient and family/Tailored Fall Risk Interventions/Use of alarms - bed, chair and/or voice tab/Visual Cue: Yellow wristband and red socks/Bed in lowest position, wheels locked, appropriate side rails in place/Call bell, personal items and telephone in reach/Instruct patient to call for assistance before getting out of bed or chair/Non-slip footwear when patient is out of bed/[place] to call system/Physically safe environment - no spills, clutter or unnecessary equipment/Purposeful Proactive Rounding/Room/bathroom lighting operational, light cord in reach

## 2024-10-15 NOTE — H&P ADULT - PROBLEM SELECTOR PLAN 5
hx of HTN on nifedepine, hydralazine and entresto  -c/w home meds Pt w/ SCr 1.66 on admission  -Baseline SCr - unknown (1.58 on 10/1)  -likely CKD   -F/U Urine Lytes, calculate FeNa  -avoid nephrotoxins  -follow BMP daily

## 2024-10-15 NOTE — H&P ADULT - NSICDXPASTMEDICALHX_GEN_ALL_CORE_FT
PAST MEDICAL HISTORY:  CAD (coronary artery disease)     Congestive heart failure (CHF)     Dyslipidemia     Hypertension

## 2024-10-15 NOTE — PATIENT PROFILE ADULT - FALL HARM RISK - DEVICES
[FreeTextEntry1] : 76 year old woman with a history as listed above presents for a followup visit. \par \par [place] is complaining of new significant left hip pain. She will see pain management soon. 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 She will continue with tramadol and tylenol prn. \par \par She denies any anginal symptoms. Clinically she is not in decompensated heart failure. Her lower extremity edema has  resolved  Her physical exam was essentially unrevealing for any significant cardiovascular findings. Her EKG is unchanged from previous. \par \par her dyspnea is likely a function of her deconditioning. She will try to exercise more but likely will be limited by her hip issues. She will continue with aquatherapy which has helped. \par \par In 2/2018 her lipid profile was at goal. \par \par Her dizziness appears to be related to positional changes which is also improving now that she is off of Lasix. She will take it on a PRN basis. She will maintain her po hydration and request that she wears her compression stockings daily. \par \par  Her blood pressure is controlled on her ABPM. I would defer any antiHTN meds at this time. \par \par Her Hb has been stable since the last PRBC transfusion.  If her hemoglobin is still under 8 she should get a blood transfusion. She is now on Procrit. She will continue to followup with heme. \par \par It appears that at least some of her symptoms were previously from bradycardia.  It will be difficult to stop the Amio given that she is so symptomatic for AF. She will continue  Amiodarone 100mg Qday. She will stay off of  the Lopressor for now. I will monitor for AF. She is at high risk for an AF ablation. At this time I don’t think she needs a pacemaker. She today remains in SR. \par \par She will have her INR checked in our [facility]. Her goal INR is 2-3 for CVA risk reduction and VTE prevention. \par \par She will followup with pulmonary (Dr. Gallegos). her last PFTs showed a moderate reduction in diffusion in 11/2017. She will need another one on her next visit. \par \par Her TSH was normal in 2/2018 She needs this checked annually as well. She went to opthalmology recently and had a normal check. \par She will have her baseline lab work, including lipids, done prior to the next visit. \par \par She will followup with me in 3 month. She will followup with you for all of her other medical needs. \par   None

## 2024-10-16 ENCOUNTER — TRANSCRIPTION ENCOUNTER (OUTPATIENT)
Age: 84
End: 2024-10-16

## 2024-10-16 LAB
ANION GAP SERPL CALC-SCNC: 4 MMOL/L — LOW (ref 5–17)
BUN SERPL-MCNC: 18 MG/DL — SIGNIFICANT CHANGE UP (ref 7–18)
CALCIUM SERPL-MCNC: 8.4 MG/DL — SIGNIFICANT CHANGE UP (ref 8.4–10.5)
CHLORIDE SERPL-SCNC: 112 MMOL/L — HIGH (ref 96–108)
CO2 SERPL-SCNC: 28 MMOL/L — SIGNIFICANT CHANGE UP (ref 22–31)
CREAT SERPL-MCNC: 1.39 MG/DL — HIGH (ref 0.5–1.3)
EGFR: 37 ML/MIN/1.73M2 — LOW
GLUCOSE SERPL-MCNC: 95 MG/DL — SIGNIFICANT CHANGE UP (ref 70–99)
HCT VFR BLD CALC: 32.2 % — LOW (ref 34.5–45)
HGB BLD-MCNC: 10.2 G/DL — LOW (ref 11.5–15.5)
MAGNESIUM SERPL-MCNC: 2.1 MG/DL — SIGNIFICANT CHANGE UP (ref 1.6–2.6)
MCHC RBC-ENTMCNC: 26.9 PG — LOW (ref 27–34)
MCHC RBC-ENTMCNC: 31.7 GM/DL — LOW (ref 32–36)
MCV RBC AUTO: 85 FL — SIGNIFICANT CHANGE UP (ref 80–100)
NRBC # BLD: 0 /100 WBCS — SIGNIFICANT CHANGE UP (ref 0–0)
PHOSPHATE SERPL-MCNC: 3.4 MG/DL — SIGNIFICANT CHANGE UP (ref 2.5–4.5)
PLATELET # BLD AUTO: 268 K/UL — SIGNIFICANT CHANGE UP (ref 150–400)
POTASSIUM SERPL-MCNC: 3.6 MMOL/L — SIGNIFICANT CHANGE UP (ref 3.5–5.3)
POTASSIUM SERPL-SCNC: 3.6 MMOL/L — SIGNIFICANT CHANGE UP (ref 3.5–5.3)
RBC # BLD: 3.79 M/UL — LOW (ref 3.8–5.2)
RBC # FLD: 13.4 % — SIGNIFICANT CHANGE UP (ref 10.3–14.5)
SODIUM SERPL-SCNC: 144 MMOL/L — SIGNIFICANT CHANGE UP (ref 135–145)
WBC # BLD: 4.69 K/UL — SIGNIFICANT CHANGE UP (ref 3.8–10.5)
WBC # FLD AUTO: 4.69 K/UL — SIGNIFICANT CHANGE UP (ref 3.8–10.5)

## 2024-10-16 PROCEDURE — 99222 1ST HOSP IP/OBS MODERATE 55: CPT

## 2024-10-16 RX ORDER — HYDRALAZINE HYDROCHLORIDE 100 MG/1
1 TABLET ORAL
Refills: 0 | DISCHARGE

## 2024-10-16 RX ORDER — SPIRONOLACTONE 100 MG/1
25 TABLET, FILM COATED ORAL DAILY
Refills: 0 | Status: DISCONTINUED | OUTPATIENT
Start: 2024-10-16 | End: 2024-10-18

## 2024-10-16 RX ORDER — SENNOSIDES 8.6 MG
2 TABLET ORAL AT BEDTIME
Refills: 0 | Status: DISCONTINUED | OUTPATIENT
Start: 2024-10-16 | End: 2024-10-18

## 2024-10-16 RX ORDER — ATORVASTATIN CALCIUM 10 MG/1
80 TABLET, FILM COATED ORAL AT BEDTIME
Refills: 0 | Status: DISCONTINUED | OUTPATIENT
Start: 2024-10-16 | End: 2024-10-18

## 2024-10-16 RX ORDER — FAMOTIDINE 40 MG
20 TABLET ORAL
Refills: 0 | Status: DISCONTINUED | OUTPATIENT
Start: 2024-10-16 | End: 2024-10-18

## 2024-10-16 RX ORDER — POTASSIUM BICARBONATE 100 %
1 POWDER (GRAM) MISCELLANEOUS
Refills: 0 | DISCHARGE

## 2024-10-16 RX ORDER — ATORVASTATIN CALCIUM 10 MG/1
1 TABLET, FILM COATED ORAL
Refills: 0 | DISCHARGE

## 2024-10-16 RX ORDER — PSYLLIUM HUSK 0.4 G
1 CAPSULE ORAL
Refills: 0 | DISCHARGE

## 2024-10-16 RX ADMIN — Medication 2 TABLET(S): at 21:30

## 2024-10-16 RX ADMIN — Medication 17 GRAM(S): at 11:21

## 2024-10-16 RX ADMIN — Medication 5000 UNIT(S): at 21:29

## 2024-10-16 RX ADMIN — Medication 5000 UNIT(S): at 06:17

## 2024-10-16 RX ADMIN — Medication 20 MILLIGRAM(S): at 19:03

## 2024-10-16 RX ADMIN — Medication 81 MILLIGRAM(S): at 11:21

## 2024-10-16 RX ADMIN — SACUBITRIL AND VALSARTAN 1 TABLET(S): 97; 103 TABLET, FILM COATED ORAL at 06:17

## 2024-10-16 RX ADMIN — SPIRONOLACTONE 25 MILLIGRAM(S): 100 TABLET, FILM COATED ORAL at 17:05

## 2024-10-16 RX ADMIN — Medication 5000 UNIT(S): at 13:21

## 2024-10-16 RX ADMIN — SACUBITRIL AND VALSARTAN 1 TABLET(S): 97; 103 TABLET, FILM COATED ORAL at 17:05

## 2024-10-16 RX ADMIN — Medication 75 MILLIGRAM(S): at 11:21

## 2024-10-16 RX ADMIN — ATORVASTATIN CALCIUM 80 MILLIGRAM(S): 10 TABLET, FILM COATED ORAL at 21:30

## 2024-10-16 NOTE — DISCHARGE NOTE PROVIDER - NSDCFUADDAPPT_GEN_ALL_CORE_FT
APPTS ARE READY TO BE MADE: [ ] YES    Best Family or Patient Contact (if needed):    Additional Information about above appointments (if needed):    1: Cardiology Dr. Raza Rondon only please  2: PCP   3:     Other comments or requests:    APPTS ARE READY TO BE MADE: [x] YES    Best Family or Patient Contact (if needed):    Additional Information about above appointments (if needed):    1: Cardiology Dr. Raza Rondon only please  2: PCP with Mariah  3:     Other comments or requests:    APPTS ARE READY TO BE MADE: [x] YES    Best Family or Patient Contact (if needed):    Additional Information about above appointments (if needed):    1: Cardiology Dr. Raza Rondon only please  2: PCP with Mariah  3:     Other comments or requests:   Prior to outreaching the patient, it was visible that the patient has secured a follow up appointment which was not scheduled by our team. Patient is scheduled on 10/25 at 10:40A at [facility] with Dr. Elsie Romo. Also has heartvascular appointments on 10/22 at 9A with Dr. Angel Mustafa

## 2024-10-16 NOTE — DISCHARGE NOTE PROVIDER - NSDCFUSCHEDAPPT_GEN_ALL_CORE_FT
Angel Mustafa  [facility]  HEARTVASC [address]  Scheduled Appointment: 10/22/2024    Elsie Romo  [facility]  INTMED [address]  Scheduled Appointment: 10/25/2024     Elsie Romo  [facility]  INTMED [address]  Scheduled Appointment: 10/25/2024

## 2024-10-16 NOTE — PROGRESS NOTE ADULT - PROBLEM SELECTOR PLAN 5
Pt w/ SCr 1.66 on admission  -Baseline SCr - unknown (1.58 on 10/1)  -likely CKD   -F/U Urine Lytes, calculate FeNa  -avoid nephrotoxins  -follow BMP daily Pt w/ SCr 1.66 on admission  -Baseline SCr - unknown (1.58 on 10/1)  -likely CKD   -F/U Urine Lytes, calculate FeNa  -avoid nephrotoxins  -follow BMP daily  -SCr 1.39 this A.M.  -eGFR 37 this A.M. consistent with Stage 3b CKD  -patient counseled on avoiding NSAIDs accept for Tylenol hx of CAD s/p stent on plavix, asa  -p/w troponin 70-->70.2 increased from 22.9 on 10/1  -repeat troponin down trended to 53.5  -no acute ischemic changes on EKG  -admit to telemetry   -c/w home meds  -cardiology consulted: Dr. Rondon

## 2024-10-16 NOTE — PROGRESS NOTE ADULT - ASSESSMENT
84F with PMH of HTN, HLD, CAD, and CHF presenting with unilateral left lower extremity swelling and right posteroinferior flank pain. Admitted to telemetry for acute anemia, INDIA, and exclusion of acute DVT/PE.    84F with PMH of HTN, HLD, CAD, and CHF presenting with unilateral left lower extremity swelling and right posteroinferior flank pain. Admitted to telemetry for acute anemia, INDIA, and exclusion of acute DVT/PE. Doppler ruled out DVT and V/Q scan showed low probability of PE. HTN and CHF will be medically managed. Pt will be discharged tomorrow if she remains stable.

## 2024-10-16 NOTE — DISCHARGE NOTE PROVIDER - CARE PROVIDER_API CALL
[facility]  Internal Medicine  [address] [place] [address], [address]  [place] [address]  Phone: [phone]  Fax: [phone]  Scheduled Appointment: 10/25/2024

## 2024-10-16 NOTE — PROGRESS NOTE ADULT - PROBLEM SELECTOR PLAN 4
hx of CAD s/p stent on plavix, asa  -p/w troponin 70-->70.2 increased from 22.9 on 10/1  -repeat troponin down trended to 53.5  -no acute ischemic changes on EKG  -admit to telemetry   -c/w home meds  -cardiology consult in AM hx of CAD s/p stent on plavix, asa  -p/w troponin 70-->70.2 increased from 22.9 on 10/1  -repeat troponin down trended to 53.5  -no acute ischemic changes on EKG  -admit to telemetry   -c/w home meds  -cardiology consulted: Dr. Rondon hx of MI 10/2023 currently on GDMT with entresto  -CXR (wet read) grossly clear  -c/w home entresto  -s/p lasix 40mg IV in ED  -no respiratory distress, hold off on further diuresis for now  -Cardiology consult: Dr. Rondon recommends c/w entresto and nifedipine. If patient refuses nifedipine, hydralazine 25mg PO TID plus Imdur ER 30mg can be prescribed instead.  -TTE shows no evidence of right heart strain; EF 50-55% and mild LV diastolic dysfunction

## 2024-10-16 NOTE — PROGRESS NOTE ADULT - PROBLEM SELECTOR PLAN 6
hx of HTN on nifedepine, hydralazine and entresto  -hold nifedipine for now in s/o leg swelling  -c/w hydralazine and entresto hx of HTN on nifedepine, hydralazine and entresto  -hold nifedipine for now in s/o leg swelling  -c/w entresto  -pt refused hydralazine and nifedipine Pt w/ SCr 1.66 on admission  -Baseline SCr - unknown (1.58 on 10/1)  -likely CKD   -F/U Urine Lytes, calculate FeNa  -avoid nephrotoxins  -follow BMP daily  -SCr 1.39 this A.M.  -eGFR 37 this A.M. consistent with Stage 3b CKD  -patient counseled on avoiding NSAIDs accept for Tylenol

## 2024-10-16 NOTE — PROGRESS NOTE ADULT - PROBLEM SELECTOR PLAN 1
p/w LLE leg swelling for 3 days  -+wilmar's sign, +erythema   -Wells score=1   -D-dimer 433 (normal for age)  -f/u LLE US doppler in s/o high clinical suspicion   -if positive f/u V/Q scan in s/o renal insufficiency  -c/w ASA, Plavix  -start FD lovenox for now due to high clinical suspicion p/w LLE leg swelling for 3 days  -+wilmar's sign, +erythema   -Wells score=1   -D-dimer 433 (normal for age)  -f/u LLE US doppler in s/o high clinical suspicion   -if positive f/u V/Q scan in s/o renal insufficiency  -c/w ASA, Plavix  -FD lovenox completed   -c/w heparin injectable 5000u Q8hrs hx of HTN on nifedepine, hydralazine and entresto  -hold nifedipine for now in s/o leg swelling  -c/w entresto    Patient counselled extensively by attending and cardiology attending this morning about antihypertensive medications and their side effects. Both attendings explained rationale for management plan given pt history of LE swelling and bradycardia. Patient agreeable to beginning spironolactone for BP control and LE edema, which was started earlier this afternoon. However, this evening, patient's son is not agreeable to starting spironolactone stating that patient's legs will swell. More extensive counselling and education done for patient and patient's son, but ultimately all recommendations denied by son. Cardiology attending made aware.

## 2024-10-16 NOTE — DISCHARGE NOTE PROVIDER - NSDCCPCAREPLAN_GEN_ALL_CORE_FT
PRINCIPAL DISCHARGE DIAGNOSIS  Diagnosis: Elevated troponin level  Assessment and Plan of Treatment:       SECONDARY DISCHARGE DIAGNOSES  Diagnosis: Anemia  Assessment and Plan of Treatment: You were found to have a hemoglobin of 9.6, decreased from your hemoglobin of 11 earlier this month. There were no signs of active bleeding on exam. You were admitted for further monitoring. Labwork was done to rule out different causes of microcytic anemia, which was negative for iron deficiency anemia or anemia of chronic disease. Please follow up with your PCP within 1-2 weeks of discharge for further workup.    Diagnosis: Venous insufficiency  Assessment and Plan of Treatment: You presented with unilateral leg swelling. Although this occurred at the same time as taking Nifedipine, this    Diagnosis: Acute on chronic diastolic congestive heart failure  Assessment and Plan of Treatment:     Diagnosis: Hypertension  Assessment and Plan of Treatment:     Diagnosis: Acute kidney injury superimposed on CKD  Assessment and Plan of Treatment:      PRINCIPAL DISCHARGE DIAGNOSIS  Diagnosis: Hypertensive urgency  Assessment and Plan of Treatment:       SECONDARY DISCHARGE DIAGNOSES  Diagnosis: Anemia  Assessment and Plan of Treatment: You were found to have a hemoglobin of 9.6, decreased from your hemoglobin of 11 earlier this month. There were no signs of active bleeding on exam. You were admitted for further monitoring. Labwork was done to rule out different causes of microcytic anemia, which was negative for iron deficiency anemia or anemia of chronic disease. Please follow up with your PCP within 1-2 weeks of discharge for further workup.    Diagnosis: Venous insufficiency  Assessment and Plan of Treatment: You presented with unilateral leg swelling. Although this occurred at the same time as taking Nifedipine, this    Diagnosis: Acute on chronic diastolic congestive heart failure  Assessment and Plan of Treatment:     Diagnosis: Hypertension  Assessment and Plan of Treatment: You have a history of Hypertension.   On this admission, your Blood Pressure was adequately controlled with XXXXX  Your blood pressure target is 120-140/80-90, maintain healthy lifestyle, low salt diet, avoid fatty food, weight loss, exercise regularly or stay active as tolerated 30 mins X 3 times per week.  Notify your doctor if you have any of the following symptoms:   (Dizziness, Lightheadedness, Blurry vision, Headache, Chest pain, Shortness of breath.)  Please continue taking your home medications and follow-up with your PCP in 1 week from discharge to adjust medications as needed.    Diagnosis: Acute kidney injury superimposed on CKD  Assessment and Plan of Treatment: You were diagnosed with acute kidney injury superimposed on likely chronic kidney disease. Your creatinine was found to be elevated around 1.66. It was 1.58 earlier this month. You were treated with IV fluids until it normalized to 1.44. You are recommended to follow up with your PCP and Nephrologist in a week from discharge.     PRINCIPAL DISCHARGE DIAGNOSIS  Diagnosis: Hypertensive urgency  Assessment and Plan of Treatment: In the ED, you were found to have a blood pressure of 213/66. A blood pressure >180/120 You also presented with 3 days of left leg swelling that had started as swelling in both legs. You stated that this had happened after you had taken nifedipine for a week. You have a history of hypertension and had previously been on nifedipine, hydralazine, and entresto. You were admitted to telemetry for continuous heart monitoring. You were seen by cardiology. You were explained that given your bradycardia (low heart rate) and age, hydralazine and imdur would be the ideal antihypertensive to take, but you preferred to not take hydralazine, nifedipine, and amlodipine due to leg swelling.  It is very important to take your medications in a timely manner as non compliance could cause severe condition such as stroke and bleed in the brain. Please check your blood pressure every day. Your blood pressure should be within 120-140/80-90. Exercise for 30 minutes at least 3 times per week and avoid fatty foods. Please follow up with your PCP in a week to adjust medications as needed.   Please take the following medications:  1) ENTRESTO 1 TABLET 2 TIMES A DAY  2) SPIRONOLACTONE 25MG 1 TABLET ONCE DAILY      SECONDARY DISCHARGE DIAGNOSES  Diagnosis: Anemia  Assessment and Plan of Treatment: You were found to have a hemoglobin of 9.6, decreased from your hemoglobin of 11 earlier this month. There were no signs of active bleeding on exam. You were admitted for further monitoring. Labwork was done to rule out different causes of microcytic anemia, which was negative for iron deficiency anemia or anemia of chronic disease. Please follow up with your PCP within 1-2 weeks of discharge for further workup.    Diagnosis: Venous insufficiency  Assessment and Plan of Treatment: You presented with left leg swelling. Although this occurred at the same time as taking Nifedipine, this likely occurred due to venous insufficiency, or impaired blood flow return to the heart that typically occurs in the lower extremities. Your leg swelling improved with Lasix. We ruled out other causes of one-sided leg swelling. Bilateral ultrasound of the legs was negative for deep vein thromboses (DVT), or clots in the legs. VQ scan was negative for pulmonary embolism, or clot in the lungs. Please see your PCP within 1-2 weeks of discharge for further management.    Diagnosis: Acute on chronic diastolic congestive heart failure  Assessment and Plan of Treatment: You have a history of diastolic congestive heart failure. You have a history of myocardial infarction (MI), or heart attack, in 2013 and are currently taking Entresto for this. An echocardiogram, or ultrasound of the heart, was done which confirmed diastolic heart failure. Please continue taking your medications as prescribed and follow up with your PCP and cardiologist within 1-2 weeks of discharge.    Diagnosis: Acute kidney injury superimposed on CKD  Assessment and Plan of Treatment: You were diagnosed with acute kidney injury superimposed on likely chronic kidney disease. Your creatinine was found to be elevated around 1.66. It was 1.58 earlier this month. You were treated with IV fluids until it normalized to 1.44. You are recommended to follow up with your PCP and Nephrologist in a week from discharge.    Diagnosis: CAD (coronary artery disease)  Assessment and Plan of Treatment: You have history of coronary artery disease (CAD) with stent placement. CAD is a narrowing of the arteries of your heart caused by a buildup of plaque made of cholesterol. The narrowing decreased the amount of blood flow to your heart causing chest pain, shortness of breath, sweating.   Please follow with your PCP/Cardiologist in a week.   Please continue taking the following medications:  1) ASPIRIN 81MG ONCE DAILY  2) PLAVIX 75MG ONCE DAILY     PRINCIPAL DISCHARGE DIAGNOSIS  Diagnosis: Hypertensive urgency  Assessment and Plan of Treatment: In the ED, you were found to have a blood pressure of 213/66. A blood pressure >180/120 You also presented with 3 days of left leg swelling that had started as swelling in both legs. You stated that this had happened after you had taken nifedipine for a week. You have a history of hypertension and had previously been on nifedipine, hydralazine, and entresto. You were admitted to telemetry for continuous heart monitoring. You were seen by cardiology. You were explained that given your bradycardia (low heart rate) and age, hydralazine and imdur would be the ideal antihypertensive to take, but you preferred to not take hydralazine, nifedipine, and amlodipine due to leg swelling.  It is very important to take your medications in a timely manner as non compliance could cause severe condition such as stroke and bleed in the brain. Please check your blood pressure every day. Your blood pressure should be within 120-140/80-90. Exercise for 30 minutes at least 3 times per week and avoid fatty foods. Please follow up with your PCP in a week to adjust medications as needed.   Please take the following medications:  1) ENTRESTO 1 TABLET 2 TIMES A DAY  2) SPIRONOLACTONE 25MG 1 TABLET ONCE DAILY  3) HYDRALAZINE 25MG 3 TIMES A DAY  4) IMDUR 30MG DAILY      SECONDARY DISCHARGE DIAGNOSES  Diagnosis: Anemia  Assessment and Plan of Treatment: You were found to have a hemoglobin of 9.6, decreased from your hemoglobin of 11 earlier this month. There were no signs of active bleeding on exam. You were admitted for further monitoring. Labwork was done to rule out different causes of microcytic anemia, which was negative for iron deficiency anemia or anemia of chronic disease. Please follow up with your PCP within 1-2 weeks of discharge for further workup.    Diagnosis: Venous insufficiency  Assessment and Plan of Treatment: You presented with left leg swelling. Although this occurred at the same time as taking Nifedipine, this likely occurred due to venous insufficiency, or impaired blood flow return to the heart that typically occurs in the lower extremities. Your leg swelling improved with Lasix. We ruled out other causes of one-sided leg swelling. Bilateral ultrasound of the legs was negative for deep vein thromboses (DVT), or clots in the legs. VQ scan was negative for pulmonary embolism, or clot in the lungs. Please see your PCP within 1-2 weeks of discharge for further management.    Diagnosis: Acute on chronic diastolic congestive heart failure  Assessment and Plan of Treatment: You have a history of diastolic congestive heart failure. You have a history of myocardial infarction (MI), or heart attack, in 2013 and are currently taking Entresto for this. An echocardiogram, or ultrasound of the heart, was done which confirmed diastolic heart failure. Please continue taking your medications as prescribed and follow up with your PCP and cardiologist within 1-2 weeks of discharge.    Diagnosis: Acute kidney injury superimposed on CKD  Assessment and Plan of Treatment: You were diagnosed with acute kidney injury superimposed on likely chronic kidney disease. Your creatinine was found to be elevated around 1.66. It was 1.58 earlier this month. You were treated with IV fluids until it normalized to 1.44. You are recommended to follow up with your PCP and Nephrologist in a week from discharge.    Diagnosis: CAD (coronary artery disease)  Assessment and Plan of Treatment: You have history of coronary artery disease (CAD) with stent placement. CAD is a narrowing of the arteries of your heart caused by a buildup of plaque made of cholesterol. The narrowing decreased the amount of blood flow to your heart causing chest pain, shortness of breath, sweating.   Please follow with your PCP/Cardiologist in a week.   Please continue taking the following medications:  1) ASPIRIN 81MG ONCE DAILY  2) PLAVIX 75MG ONCE DAILY     PRINCIPAL DISCHARGE DIAGNOSIS  Diagnosis: Hypertensive urgency  Assessment and Plan of Treatment: You presented to hospital with 3 days of left leg swelling that had started as swelling in both legs. In the ED, you were found to have a blood pressure of 213/66. You stated that this had happened after you had taken nifedipine for a week. You have a history of hypertension and had previously been on nifedipine, hydralazine, and entresto. You were admitted to telemetry for continuous heart monitoring. You were seen by cardiology Dr. Phoebe Espitia. You could not tolerate Calcium channel blockers due to leg swelling and hence Nifedipine discontinued. You also could not get a group of medication called Beta blocker due to low normal heart rate.   You was seen by Cardiologist Dr. Espitia who recommended Hydralazine and Isosorbide (Imdur); You was counsled that you are only being prescribed a low dose of Hydralazine 25 mg three times a day along with a medication called Imdur (Isosorbide) as you have underlying hearty disease and stent. You was also placed on a diuretic or water pill called Spironolactone which itself does not cause leg swelling  It is very important to take your medications in a timely manner as non compliance could cause severe condition such as stroke and bleed in the brain. Please check your blood pressure every day. Your blood pressure should be within 120-140/80-90. Exercise for 30 minutes at least 3 times per week and avoid fatty foods. Please follow up with your PCP in a week to adjust medications as needed.   Please take the following medications:  1) ENTRESTO 1 TABLET 2 TIMES A DAY AS BEFORE  2) SPIRONOLACTONE 25MG 1 TABLET ONCE DAILY (NEW)  3) HYDRALAZINE 25MG 3 TIMES A DAY (NEW)  4) IMDUR 30MG DAILY (NEW)  PLEASE STOP CHLORTHALIDONE AS YOU WAS PLACED ON A NEW DIURETIC AS RECOMMENDED BY CARDIOLOGY  PLEASE FOLLOW UP WITH DR. PHOEBE ESPITIA CARDIOLGIST IF YOU WISH AS OUTPATIENT  YOU HAVE BEEN REFERRED FOR  A NEW PCP REFERRAL AS YOU REQUESTED      SECONDARY DISCHARGE DIAGNOSES  Diagnosis: Venous insufficiency  Assessment and Plan of Treatment: You presented with left leg swelling. Although this occurred at the same time as taking Nifedipine you  likely has underlying venous insufficiency   or impaired blood flow return to the heart that typically occurs in the lower extremities due to incompetency of valves in the veins with aging. We ruled out other causes of one-sided leg swelling. Bilateral ultrasound of the legs was negative for deep vein thromboses (DVT), or clots in the legs. V/Q (Ventilation Perfusion ) scan was negative for pulmonary embolism, or clot in the lungs. Please see your PCP within 1-2 weeks of discharge for further management.  PLEASE KEEP LEGS ELEVATED WHILE SITTING OR IN BED  PLEASE FOLLOW UP WITH A VASCULAR SURGEON AS OUTPATIENT IF LEG SWELLING PERSIST OR RECURRS AS YOU MAY BENEFIT FROM COMPRESSION STOCKINGS AS EXPLAINED TO YOU AND YOUR SON AT BEDSIDEAND NIECE OVER THE PHONE    Diagnosis: Anemia  Assessment and Plan of Treatment: You were found to have a hemoglobin of 9.6, decreased from your hemoglobin of 11 earlier this month. There were no signs of active bleeding on exam. You were admitted for further monitoring. Labwork was done to rule out different causes of microcytic anemia, which was negative for iron deficiency anemia or anemia of chronic disease. Please follow up with your PCP within 1-2 weeks of discharge for further workup.    Diagnosis: Acute on chronic diastolic congestive heart failure  Assessment and Plan of Treatment: You have a history of diastolic congestive heart failure. You have a history of myocardial infarction (MI), or heart attack, in 2013 and are currently taking Entresto for this. An echocardiogram, or ultrasound of the heart, was done which confirmed diastolic heart failure. Please continue taking your medications as prescribed and follow up with your PCP and cardiologist within 1-2 weeks of discharge.    Diagnosis: Acute kidney injury superimposed on CKD  Assessment and Plan of Treatment: You were diagnosed with acute kidney injury superimposed on likely chronic kidney disease likely Hypertensive kidney disease due to elevated Blood pressure.  Your creatinine was found to be elevated around 1.66. It was 1.58 earlier this month. You were treated with IV fluids until it normalized to 1.44. You are recommended to follow up with your PCP and Nephrologist in a week from discharge.  AVOID MEDICATIONS LIKE IBUPROFEN, MOTRIN, ALEEVE, ADVIL AS IT MAY CAUSE KIDNEY DAMAGE; IF YOU HAVE PAIN, MAY TAKE TYLENOL    Diagnosis: CAD (coronary artery disease)  Assessment and Plan of Treatment: You have history of coronary artery disease (CAD) with stent placement. CAD is a narrowing of the arteries of your heart caused by a buildup of plaque made of cholesterol. The narrowing decreased the amount of blood flow to your heart causing chest pain, shortness of breath, sweating.   Please follow with your PCP/Cardiologist in a week.   Please continue taking the following medications:  1) ASPIRIN 81MG ONCE DAILY  2) PLAVIX 75MG ONCE DAILY  You may take over the counter Pepcid daily to prevent any inflammation in stomach form dual antiplatelet therapy with Aspirin and Clopidrogel if you develop any burining stomach pain or reflux symptoms

## 2024-10-16 NOTE — PROGRESS NOTE ADULT - SUBJECTIVE AND OBJECTIVE BOX
MS3 Progress Note discussed with PGY-1 and attending    PAGER #: [[phone]] TILL 5:00 PM  PLEASE CONTACT ON CALL TEAM:  - On Call Team (Please refer to Pranay) FROM 5:00 PM - 8:30PM  - Nightfloat Team FROM 8:30 -7:30 AM    INTERVAL HPI/OVERNIGHT EVENTS:   -     REVIEW OF SYSTEMS:  CONSTITUTIONAL: No fever, weight loss, or fatigue  RESPIRATORY: No cough, wheezing, chills or hemoptysis; No shortness of breath  CARDIOVASCULAR: No chest pain, palpitations, dizziness, or leg swelling  GASTROINTESTINAL: No abdominal pain. No nausea, vomiting, or hematemesis; No diarrhea or constipation. No melena or hematochezia.  GENITOURINARY: No dysuria or hematuria, urinary frequency  NEUROLOGICAL: No headaches, memory loss, loss of strength, numbness, or tremors  SKIN: No itching, burning, rashes, or lesions     MEDICATIONS  (STANDING):  aspirin enteric coated 81 milliGRAM(s) Oral daily  clopidogrel Tablet 75 milliGRAM(s) Oral daily  heparin   Injectable 5000 Unit(s) SubCutaneous every 8 hours  NIFEdipine XL 30 milliGRAM(s) Oral daily  polyethylene glycol 3350 17 Gram(s) Oral daily  sacubitril 97 mG/valsartan 103 mG 1 Tablet(s) Oral two times a day  senna 2 Tablet(s) Oral at bedtime    MEDICATIONS  (PRN):  acetaminophen     Tablet .. 650 milliGRAM(s) Oral every 6 hours PRN Temp greater or equal to 38C (100.4F), Mild Pain (1 - 3)      Vital Signs Last 24 Hrs  T(C): 36.9 (16 Oct 2024 11:02), Max: 37 (16 Oct 2024 08:10)  T(F): 98.4 (16 Oct 2024 11:02), Max: 98.6 (16 Oct 2024 08:10)  HR: 48 (16 Oct 2024 11:02) (48 - 73)  BP: 160/47 (16 Oct 2024 11:02) (152/48 - 176/62)  BP(mean): --  RR: 18 (16 Oct 2024 11:02) (17 - 20)  SpO2: 96% (16 Oct 2024 11:02) (96% - 100%)    Parameters below as of 16 Oct 2024 11:02  Patient On (Oxygen Delivery Method): room air        PHYSICAL EXAMINATION:  GENERAL: NAD, lying in bed comfortably. AAOx  NERVOUS SYSTEM: Speech clear. No deficits   HEAD:  Atraumatic, Normocephalic  EYES: EOMI, PERRLA, conjunctiva and sclera clear  ENT: Moist mucous membranes  NECK: Supple, No JVD, normal thyroid  CHEST/LUNG: Clear to auscultation bilaterally; no rales, rhonchi, wheezing, or rubs. No unlabored respirations   HEART: Regular rate and rhythm; No murmurs, rubs, or gallops  ABDOMEN: Bowel sounds present; Soft, nontender, nondistended.   EXTREMITIES:  2+ Peripheral Pulses, brisk capillary refill. No clubbing, cyanosis, or edema  MSK: FROM all 4 extremities, full and equal strength  SKIN: Warm dry. No rashes or lesions                          10.2   4.69  )-----------( 268      ( 16 Oct 2024 05:53 )             32.2     10-16    144  |  112[H]  |  18  ----------------------------<  95  3.6   |  28  |  1.39[H]    Ca    8.4      16 Oct 2024 05:53  Phos  3.4     10-16  Mg     2.1     10-16    TPro  6.4  /  Alb  3.3[L]  /  TBili  0.4  /  DBili  x   /  AST  25  /  ALT  29  /  AlkPhos  101  10-15    LIVER FUNCTIONS - ( 15 Oct 2024 05:20 )  Alb: 3.3 g/dL / Pro: 6.4 g/dL / ALK PHOS: 101 U/L / ALT: 29 U/L DA / AST: 25 U/L / GGT: x               PT/INR - ( 15 Oct 2024 05:20 )   PT: 11.4 sec;   INR: 0.97 ratio         PTT - ( 15 Oct 2024 05:20 )  PTT:26.8 sec      CAPILLARY BLOOD GLUCOSE          RADIOLOGY & ADDITIONAL TESTS:                   MS3 Progress Note discussed with PGY-1 and attending    PAGER #: [[phone]] TILL 5:00 PM  PLEASE CONTACT ON CALL TEAM:  - On Call Team (Please refer to Pranay) FROM 5:00 PM - 8:30PM  - Nightfloat Team FROM 8:30 -7:30 AM    INTERVAL HPI/OVERNIGHT EVENTS:   There were no acute events overnight. Patient endorses right-sided flank pain that worsens with movement. She states the swelling in her legs have improved.     REVIEW OF SYSTEMS:  CONSTITUTIONAL: No fever, chills or fatigue  RESPIRATORY: No cough, wheezing or shortness of breath  CARDIOVASCULAR: No chest pain, palpitations, dizziness, or leg swelling  GASTROINTESTINAL: Admits to chronic constipation. No abdominal pain, nausea, vomiting.  GENITOURINARY: No dysuria   NEUROLOGICAL: No headaches, memory loss, loss of strength       MEDICATIONS  (STANDING):  aspirin enteric coated 81 milliGRAM(s) Oral daily  clopidogrel Tablet 75 milliGRAM(s) Oral daily  heparin   Injectable 5000 Unit(s) SubCutaneous every 8 hours  NIFEdipine XL 30 milliGRAM(s) Oral daily  polyethylene glycol 3350 17 Gram(s) Oral daily  sacubitril 97 mG/valsartan 103 mG 1 Tablet(s) Oral two times a day  senna 2 Tablet(s) Oral at bedtime    MEDICATIONS  (PRN):  acetaminophen     Tablet .. 650 milliGRAM(s) Oral every 6 hours PRN Temp greater or equal to 38C (100.4F), Mild Pain (1 - 3)      Vital Signs Last 24 Hrs  T(C): 36.9 (16 Oct 2024 11:02), Max: 37 (16 Oct 2024 08:10)  T(F): 98.4 (16 Oct 2024 11:02), Max: 98.6 (16 Oct 2024 08:10)  HR: 48 (16 Oct 2024 11:02) (48 - 73)  BP: 160/47 (16 Oct 2024 11:02) (152/48 - 176/62)  BP(mean): --  RR: 18 (16 Oct 2024 11:02) (17 - 20)  SpO2: 96% (16 Oct 2024 11:02) (96% - 100%)    Parameters below as of 16 Oct 2024 11:02  Patient On (Oxygen Delivery Method): room air        PHYSICAL EXAMINATION:  GENERAL: NAD, lying in bed comfortably. AAOx3  NERVOUS SYSTEM: Speech clear. No focal neurological deficits   HEAD: Atraumatic, Normocephalic  EYES: EOMI, PERRLA, conjunctiva and sclera clear  NECK: Supple, No JVD, nonenlarged thyroid  CHEST/LUNG: Crackles in the posteroinferior lungs bilaterally; no labored respirations  HEART: Bradycardic rate and regular rhythm appreciated; No murmurs, rubs, or gallops  ABDOMEN: Bowel sounds present; Soft, nontender, nondistended.   EXTREMITIES:  2+ Peripheral Pulses, No clubbing, cyanosis, or edema  MSK: FROM all 4 extremities, full and equal strength  SKIN: Warm dry. No rashes or lesions                          10.2   4.69  )-----------( 268      ( 16 Oct 2024 05:53 )             32.2     10-16    144  |  112[H]  |  18  ----------------------------<  95  3.6   |  28  |  1.39[H]    Ca    8.4      16 Oct 2024 05:53  Phos  3.4     10-16  Mg     2.1     10-16    TPro  6.4  /  Alb  3.3[L]  /  TBili  0.4  /  DBili  x   /  AST  25  /  ALT  29  /  AlkPhos  101  10-15    LIVER FUNCTIONS - ( 15 Oct 2024 05:20 )  Alb: 3.3 g/dL / Pro: 6.4 g/dL / ALK PHOS: 101 U/L / ALT: 29 U/L DA / AST: 25 U/L / GGT: x               PT/INR - ( 15 Oct 2024 05:20 )   PT: 11.4 sec;   INR: 0.97 ratio         PTT - ( 15 Oct 2024 05:20 )  PTT:26.8 sec      CAPILLARY BLOOD GLUCOSE  POCT blood glucose 126 mg/dL (Oct 14 @ 19:53)        RADIOLOGY & ADDITIONAL TESTS:  US DUPLEX VENOUS LOWER EXT:  No evidence of deep venous thrombosis in either lower extremity.    NM PULM VENTILATION PERFUS IMG:   low probability of pulmonary embolus.    TTE CONCLUSIONS:   1. Left ventricular cavity is normal in size. Left ventricular wall thickness is normal. Left ventricular systolic function is normal with an ejection fraction visually estimated at 50 to 55 %. There are no regional wall motion abnormalities seen.   2. There is mild (grade 1) left ventricular diastolic dysfunction.   3. Normal right ventricular cavity size, with normal wall thickness, and normal right ventricular systolic function. Tricuspid annular plane systolic excursion (TAPSE) is 2.0 cm (normal >=1.7 cm). Tricuspid annular tissue Doppler S' is 12.0 cm/s (normal >10 cm/s).   4. Normal left and right atrial size.   5. No significant valvular disease.   6. Pulmonary artery systolic pressure could not be estimated.   7. No pericardial effusion seen.   8. No prior echocardiogram is available for comparison.

## 2024-10-16 NOTE — PROGRESS NOTE ADULT - PROBLEM SELECTOR PLAN 2
p/w Hbg 9.4, acute drop from 11.6 on 10/21  -maintain active type/screen and 2 large bore IVs  -transfuse to maintain hbg >7  -f/u iron panel, b12, folate, haptoglobin, retic count, LDH p/w Hbg 9.4, acute drop from 11.6 on 10/21  -maintain active type/screen and 2 large bore IVs  -transfuse to maintain hbg >7  -iron panel unremarkable  -b12, folate, and haptoglobin WNL  -retic count 1.0% WNL  - U/L p/w LLE leg swelling for 3 days  -+wilmar's sign, +erythema   -Wells score=1   -D-dimer 433 (normal for age)  -f/u LLE US doppler in s/o high clinical suspicion   -if positive f/u V/Q scan in s/o renal insufficiency  -c/w ASA, Plavix  -FD lovenox completed   -c/w heparin injectable 5000u Q8hrs

## 2024-10-16 NOTE — DISCHARGE NOTE PROVIDER - HOSPITAL COURSE
84F, from home, ambulates independently, with PMH of HTN, HLD, CAD, and CHF presenting with unilateral left lower extremity swelling and right posteroinferior flank pain. Patient states that symptoms started approximately 3 days ago. She reports that initially, both legs were swollen, but the swelling has since become localized to the left side. Before the swelling became localized to the left leg and after both legs became swollen, the patient developed an acute onset of pain in her right side which has persisted and noticeable with deep breaths but obvious when she moves her torso side to side. Denies shortness of breath, palpitations, or lightheadedness. Denies dysuria however reports increased urinary frequency recently. Patient was seen in [facility] ED with basic labs sent on 10/1 for high blood pressure reading at home and per ED provider note on that visit patient reported mild bilateral leg swelling since starting nifedipine approximately 1 week prior. Patient was discharged home after BP control with home dose of nifedipine PO. Patient admitted to telemetry for acute anemia, INDIA, and exclusion of acute DVT/PE.     Patient received lasix 40mg in ED. VQ done which was negative for PE. Swelling improved, likely due to venous insufficiency. Bilateral doppler negative for DVT.    TTE shows LVEF 55%, G1DD. Trops peaked at 70s then downtrended, likely 2/2 demand ischemia. Patient continued on home Entresto. Had previously been on hydralazine, nifedipine, and amlodipine which patient did not want to continue due to leg swelling. BB not ideal given bradycardia, while thiazides are also not preferred due to patient age and risk associated with thiazides. Cardiology consulted. Patient counselled extensively by attending and cardiology attending about antihypertensive medications and their side effects. Both attendings explained rationale for management plan given pt history of LE swelling and bradycardia. Patient agreeable to beginning spironolactone for BP control and LE edema, which was started. However, patient's son is not agreeable to starting spironolactone stating that patient's legs will swell. More extensive counselling and education done for patient and patient's son, but ultimately all recommendations denied by son.     Patient also found to have acute anemia, hgb 9.4 on admission (decreased from 11.6 on 10/21). No active signs of bleeding, iron panel, B12, folate, haptoglobin, retic count WNL. LDH elevated at 252.     Pt also presented with INDIA on likely CKD. SCr 1.66 on admission (baseline unknown, had been 1.58 on 10/1). Urine lytes showed *** 84F, from home, ambulates independently, with PMH of HTN, HLD, CAD, and CHF presenting with unilateral left lower extremity swelling and right posteroinferior flank pain. Patient states that symptoms started approximately 3 days ago after starting nifedipine the week prior. She reports that initially, both legs were swollen, but the swelling has since become localized to the left side. Before the swelling became localized to the left leg and after both legs became swollen, the patient developed an acute onset of pain in her right side which has persisted and noticeable with deep breaths but obvious when she moves her torso side to side. Patient admitted to telemetry for acute anemia, INDIA, and exclusion of acute DVT/PE.     Patient received lasix 40mg in ED. VQ done which was negative for PE. Swelling improved, likely due to venous insufficiency. Bilateral doppler negative for DVT.    TTE shows LVEF 55%, G1DD. Trops peaked at 70s then downtrended, likely 2/2 demand ischemia. Patient continued on home Entresto. Had previously been on hydralazine, nifedipine, and amlodipine which patient did not want to continue due to leg swelling. BB not ideal given bradycardia, while thiazides are also not preferred due to patient age and risk associated with thiazides. Cardiology consulted. Pt started on spironolactone.    Patient also found to have acute anemia, hgb 9.4 on admission (decreased from 11.6 on 10/21). No active signs of bleeding, iron panel, B12, folate, haptoglobin, retic count WNL. LDH elevated at 252.     Pt also presented with INDIA on likely CKD. SCr 1.66 on admission (baseline unknown, had been 1.58 on 10/1). 84F, from home, ambulates independently, with PMH of HTN, HLD, CAD, and CHF presenting with unilateral left lower extremity swelling and right posteroinferior flank pain. Patient states that symptoms started approximately 3 days ago after starting nifedipine the week prior. She reports that initially, both legs were swollen, but the swelling has since become localized to the left side. Before the swelling became localized to the left leg and after both legs became swollen, the patient developed an acute onset of pain in her right side which has persisted and noticeable with deep breaths but obvious when she moves her torso side to side. Patient admitted to telemetry for acute anemia, INDIA, and exclusion of acute DVT/PE.     Patient received lasix 40mg in ED. VQ done which was negative for PE. Swelling improved, likely due to venous insufficiency. Bilateral doppler negative for DVT.    TTE shows LVEF 55%, G1DD. Trops peaked at 70s then downtrended, likely 2/2 demand ischemia. Patient continued on home Entresto. Had previously been on hydralazine, nifedipine, and amlodipine which patient did not want to continue due to leg swelling. BB not ideal given bradycardia, while thiazides are also not preferred due to patient age and risk associated with thiazides. Cardiology consulted. Pt started on spironolactone.    Patient also found to have acute anemia, hgb 9.4 on admission (decreased from 11.6 on 10/21). No active signs of bleeding, iron panel, B12, folate, haptoglobin, retic count WNL. LDH elevated at 252.     Pt also presented with INDIA on likely CKD. SCr 1.66 on admission (baseline unknown, had been 1.58 on 10/1).    Patient is able to ambulate and tolerate diet prior to discharge. Patient is stable for discharge per attending and is advised to follow up with PCP as outpatient. Please refer to patient's complete medical chart with documents for a full hospital course, for this is only a brief summary. 84F, from home, ambulates independently, with PMH of HTN, HLD, CAD, and CHF presenting with unilateral left lower extremity swelling and right posteroinferior flank pain. Patient states that symptoms started approximately 3 days ago after starting nifedipine the week prior. She reports that initially, both legs were swollen, but the swelling has since become localized to the left side. Before the swelling became localized to the left leg and after both legs became swollen, the patient developed an acute onset of pain in her right side which has persisted and noticeable with deep breaths but obvious when she moves her torso side to side. Patient admitted to telemetry for acute anemia, INDIA, and exclusion of acute DVT/PE.     Patient received lasix 40mg in ED. VQ done which was negative for PE. Swelling improved, likely due to venous insufficiency. Bilateral doppler negative for DVT.    TTE shows LVEF 55%, G1DD. Trops peaked at 70s then downtrended, likely 2/2 demand ischemia. Patient continued on home Entresto. Had previously been on hydralazine, nifedipine, and amlodipine which patient did not want to continue due to leg swelling. BB not ideal given bradycardia, while thiazides are also not preferred due to patient age and risk associated with thiazides. Cardiology consulted. Pt started on spironolactone, hydralazine, and imdur.    Patient also found to have acute anemia, hgb 9.4 on admission (decreased from 11.6 on 10/21). No active signs of bleeding, iron panel, B12, folate, haptoglobin, retic count WNL. LDH elevated at 252.     Pt also presented with INDIA on likely CKD. SCr 1.66 on admission (baseline unknown, had been 1.58 on 10/1).    Patient is able to ambulate and tolerate diet prior to discharge. Patient is stable for discharge per attending and is advised to follow up with PCP as outpatient. Please refer to patient's complete medical chart with documents for a full hospital course, for this is only a brief summary.

## 2024-10-16 NOTE — DISCHARGE NOTE PROVIDER - NSDCMRMEDTOKEN_GEN_ALL_CORE_FT
aspirin 81 mg oral tablet: 1 tab(s) orally once a day  atorvastatin 80 mg oral tablet: 1 tab(s) orally once a day  clopidogrel 75 mg oral tablet: 1 tab(s) orally once a day  D3 50 mcg (2000 intl units) oral capsule: 1 cap(s) orally once a day  magnesium oxide 400 mg oral capsule: 1 cap(s) orally  potassium bicarbonate 20 mEq oral tablet, effervescent: 1 tab(s) orally  sacubitril-valsartan 97 mg-103 mg oral tablet: 1 tab(s) orally 2 times a day   aspirin 81 mg oral tablet: 1 tab(s) orally once a day  atorvastatin 80 mg oral tablet: 1 tab(s) orally once a day  clopidogrel 75 mg oral tablet: 1 tab(s) orally once a day  D3 50 mcg (2000 intl units) oral capsule: 1 cap(s) orally once a day  hydrALAZINE 25 mg oral tablet: 1 tab(s) orally 3 times a day  isosorbide mononitrate 30 mg oral tablet, extended release: 1 tab(s) orally once a day  magnesium oxide 400 mg oral capsule: 1 cap(s) orally  potassium bicarbonate 20 mEq oral tablet, effervescent: 1 tab(s) orally  sacubitril-valsartan 97 mg-103 mg oral tablet: 1 tab(s) orally 2 times a day  spironolactone 25 mg oral tablet: 1 tab(s) orally once a day

## 2024-10-16 NOTE — CONSULT NOTE ADULT - SUBJECTIVE AND OBJECTIVE BOX
CHIEF COMPLAINT: Leg swelling    HPI: 84 F with ? 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 Patient reports     PAST MEDICAL & SURGICAL HISTORY:  As above    Allergies    No Known Allergies    MEDICATIONS  (STANDING):  aspirin enteric coated 81 milliGRAM(s) Oral daily  clopidogrel Tablet 75 milliGRAM(s) Oral daily  heparin   Injectable 5000 Unit(s) SubCutaneous every 8 hours  NIFEdipine XL 30 milliGRAM(s) Oral daily  polyethylene glycol 3350 17 Gram(s) Oral daily  sacubitril 97 mG/valsartan 103 mG 1 Tablet(s) Oral two times a day  senna 2 Tablet(s) Oral at bedtime    MEDICATIONS  (PRN):  acetaminophen     Tablet .. 650 milliGRAM(s) Oral every 6 hours PRN Temp greater or equal to 38C (100.4F), Mild Pain (1 - 3)      FAMILY HISTORY:    No family history of premature coronary artery disease or sudden cardiac death    SOCIAL HISTORY:  Smoking-  Alcohol-  Illicit Drug use-    REVIEW OF SYSTEMS:  Constitutional: [ ] fever, [ ]weight loss,  [ ]fatigue  Eyes: [ ] visual changes  Respiratory: [ ]shortness of breath;  [ ] cough, [ ]wheezing, [ ]chills, [ ]hemoptysis  Cardiovascular: [ ] chest pain, [ ]palpitations, [ ]dizziness,  [ ]leg swelling [ ]syncope  Gastrointestinal: [ ] abdominal pain, [ ]nausea, [ ]vomiting,  [ ]diarrhea   Genitourinary: [ ] dysuria, [ ] hematuria  Neurologic: [ ] headaches [ ] tremors  [ ] weakness [ ] lightheadedness  Skin: [ ] itching, [ ]burning, [ ] rashes  Endocrine: [ ] heat or cold intolerance  Musculoskeletal: [ ] joint pain or swelling; [ ] muscle, back, or extremity pain  Psychiatric: [ ] depression, [ ]anxiety, [ ]mood swings, or [ ]difficulty sleeping  Hematologic: [ ] easy bruising, [ ] bleeding gums       [ x] All others negative except as listed in HPI	  [ ] Unable to obtain    Vital Signs Last 24 Hrs  T(C): 36.9 (16 Oct 2024 11:02), Max: 37 (16 Oct 2024 08:10)  T(F): 98.4 (16 Oct 2024 11:02), Max: 98.6 (16 Oct 2024 08:10)  HR: 48 (16 Oct 2024 11:02) (48 - 73)  BP: 160/47 (16 Oct 2024 11:02) (152/48 - 176/62)  BP(mean): --  RR: 18 (16 Oct 2024 11:02) (17 - 20)  SpO2: 96% (16 Oct 2024 11:02) (96% - 100%)    Parameters below as of 16 Oct 2024 11:02  Patient On (Oxygen Delivery Method): room air      I&O's Summary    15 Oct 2024 07:01  -  16 Oct 2024 07:00  --------------------------------------------------------  IN: 300 mL / OUT: 200 mL / NET: 100 mL    16 Oct 2024 07:01  -  16 Oct 2024 14:13  --------------------------------------------------------  IN: 300 mL / OUT: 0 mL / NET: 300 mL        PHYSICAL EXAM:  General: No acute distress  HEENT: EOMI, PERRL  Neck: Supple, No JVD  Lungs: Clear to auscultation bilaterally; No rales or wheezing  Heart: Regular rate and rhythm; No murmurs, rubs, or gallops  Abdomen: Nontender, bowel sounds present  Extremities: No clubbing, cyanosis, or edema  Nervous system:  Alert & Oriented X3, no focal deficits  Psychiatric: Normal affect  Skin: No rashes or lesions      LABS:  10-16    144  |  112[H]  |  18  ----------------------------<  95  3.6   |  28  |  1.39[H]    Ca    8.4      16 Oct 2024 05:53  Phos  3.4     10-16  Mg     2.1     10-16    TPro  6.4  /  Alb  3.3[L]  /  TBili  0.4  /  DBili  x   /  AST  25  /  ALT  29  /  AlkPhos  101  10-15    Creatinine Trend: 1.39<--, 1.49<--, 1.66<--, 1.58<--                        10.2   4.69  )-----------( 268      ( 16 Oct 2024 05:53 )             32.2     PT/INR - ( 15 Oct 2024 05:20 )   PT: 11.4 sec;   INR: 0.97 ratio         PTT - ( 15 Oct 2024 05:20 )  PTT:26.8 sec    Lipid Panel:      Cardiac Enzymes:   Troponin I, High Sensitivity Result: 53.5 ng/L (10-15-24 @ 05:20)  Troponin I, High Sensitivity Result: 72.6 ng/L (10-15-24 @ 00:32)  Troponin I, High Sensitivity Result: 70.0 ng/L (10-14-24 @ 21:29)    pro-BNP: 1543      RADIOLOGY:  < from: Xray Chest 1 View- PORTABLE-Urgent (10.14.24 @ 20:40) >  Lungs are clear     < end of copied text >      < from: NM Pulmonary Ventilation/Perfusion Scan (10.15.24 @ 13:22) >  IMPRESSION: Low probability of pulmonary embolus.    < end of copied text >    < from: US Duplex Venous Lower Ext Complete, Bilateral (10.15.24 @ 08:53) >  Subcutaneous edema in the left calf.    IMPRESSION:  No evidence of deep venous thrombosis in either lower extremity.      < end of copied text >      ECG [my interpretation]: 10/14/24 @ 19:58: Sinus bradycardia at 55, otherwise normal    TELEMETRY:    ECHO: < from: TTE W or WO Ultrasound Enhancing Agent (10.15.24 @ 14:33) >  CONCLUSIONS:      1. Left ventricular cavity is normal in size. Left ventricular wall thickness is normal. Left ventricular systolic function is normal with an ejection fraction visually estimated at 50 to 55 %. There are no regional wall motion abnormalities seen.   2. There is mild (grade 1) left ventricular diastolic dysfunction.   3. Normal right ventricular cavity size, with normal wall thickness, and normal right ventricular systolic function. Tricuspid annular plane systolic excursion (TAPSE) is 2.0 cm (normal >=1.7 cm). Tricuspid annular tissue Doppler S' is 12.0 cm/s (normal >10 cm/s).   4. Normal left and right atrial size.   5. No significant valvular disease.   6. Pulmonary artery systolic pressure could not be estimated.   7. No pericardial effusion seen.   8. No prior echocardiogram is available for comparison.    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 Patient reports edema has been on and off for over a week, not associated with dyspnea, orthopnea, or PND. Patient thinks edema worsened by nifedipine and hydralazine and does not want to try any of those medications. No cardiac complaints.      PAST MEDICAL & SURGICAL HISTORY:  As above    Allergies    No Known Allergies    MEDICATIONS  (STANDING):  aspirin enteric coated 81 milliGRAM(s) Oral daily  clopidogrel Tablet 75 milliGRAM(s) Oral daily  heparin   Injectable 5000 Unit(s) SubCutaneous every 8 hours  NIFEdipine XL 30 milliGRAM(s) Oral daily  polyethylene glycol 3350 17 Gram(s) Oral daily  sacubitril 97 mG/valsartan 103 mG 1 Tablet(s) Oral two times a day  senna 2 Tablet(s) Oral at bedtime    MEDICATIONS  (PRN):  acetaminophen     Tablet .. 650 milliGRAM(s) Oral every 6 hours PRN Temp greater or equal to 38C (100.4F), Mild Pain (1 - 3)      FAMILY HISTORY:    No family history of premature coronary artery disease or sudden cardiac death    SOCIAL HISTORY:  Smoking-Denies  Alcohol-Denies  Illicit Drug use-Denies    REVIEW OF SYSTEMS:  Constitutional: [ ] fever, [ ]weight loss,  [ ]fatigue  Eyes: [ ] visual changes  Respiratory: [ ]shortness of breath;  [ ] cough, [ ]wheezing, [ ]chills, [ ]hemoptysis  Cardiovascular: [ ] chest pain, [ ]palpitations, [ ]dizziness,  [ ]leg swelling [ ]syncope  Gastrointestinal: [ ] abdominal pain, [ ]nausea, [ ]vomiting,  [ ]diarrhea   Genitourinary: [ ] dysuria, [ ] hematuria  Neurologic: [ ] headaches [ ] tremors  [ ] weakness [ ] lightheadedness  Skin: [ ] itching, [ ]burning, [ ] rashes  Endocrine: [ ] heat or cold intolerance  Musculoskeletal: [ ] joint pain or swelling; [ ] muscle, back, or extremity pain  Psychiatric: [ ] depression, [ ]anxiety, [ ]mood swings, or [ ]difficulty sleeping  Hematologic: [ ] easy bruising, [ ] bleeding gums       [ x] All others negative except as listed in HPI	  [ ] Unable to obtain    Vital Signs Last 24 Hrs  T(C): 36.9 (16 Oct 2024 11:02), Max: 37 (16 Oct 2024 08:10)  T(F): 98.4 (16 Oct 2024 11:02), Max: 98.6 (16 Oct 2024 08:10)  HR: 48 (16 Oct 2024 11:02) (48 - 73)  BP: 160/47 (16 Oct 2024 11:02) (152/48 - 176/62)  BP(mean): --  RR: 18 (16 Oct 2024 11:02) (17 - 20)  SpO2: 96% (16 Oct 2024 11:02) (96% - 100%)    Parameters below as of 16 Oct 2024 11:02  Patient On (Oxygen Delivery Method): room air      I&O's Summary    15 Oct 2024 07:01  -  16 Oct 2024 07:00  --------------------------------------------------------  IN: 300 mL / OUT: 200 mL / NET: 100 mL    16 Oct 2024 07:01  -  16 Oct 2024 14:13  --------------------------------------------------------  IN: 300 mL / OUT: 0 mL / NET: 300 mL        PHYSICAL EXAM:  General: No acute distress  HEENT: EOMI, PERRL  Neck: Supple, No JVD  Lungs: Clear to auscultation bilaterally; No rales or wheezing  Heart: Regular rate and rhythm; No murmurs, rubs, or gallops  Abdomen: Nontender, bowel sounds present  Extremities: No clubbing, cyanosis, or edema  Nervous system:  Alert & Oriented X3, no focal deficits  Psychiatric: Normal affect  Skin: No rashes or lesions      LABS:  10-16    144  |  112[H]  |  18  ----------------------------<  95  3.6   |  28  |  1.39[H]    Ca    8.4      16 Oct 2024 05:53  Phos  3.4     10-16  Mg     2.1     10-16    TPro  6.4  /  Alb  3.3[L]  /  TBili  0.4  /  DBili  x   /  AST  25  /  ALT  29  /  AlkPhos  101  10-15    Creatinine Trend: 1.39<--, 1.49<--, 1.66<--, 1.58<--                        10.2   4.69  )-----------( 268      ( 16 Oct 2024 05:53 )             32.2     PT/INR - ( 15 Oct 2024 05:20 )   PT: 11.4 sec;   INR: 0.97 ratio         PTT - ( 15 Oct 2024 05:20 )  PTT:26.8 sec    Lipid Panel:      Cardiac Enzymes:   Troponin I, High Sensitivity Result: 53.5 ng/L (10-15-24 @ 05:20)  Troponin I, High Sensitivity Result: 72.6 ng/L (10-15-24 @ 00:32)  Troponin I, High Sensitivity Result: 70.0 ng/L (10-14-24 @ 21:29)    pro-BNP: 1543      RADIOLOGY:  < from: Xray Chest 1 View- PORTABLE-Urgent (10.14.24 @ 20:40) >  Lungs are clear     < end of copied text >      < from: NM Pulmonary Ventilation/Perfusion Scan (10.15.24 @ 13:22) >  IMPRESSION: Low probability of pulmonary embolus.    < end of copied text >    < from: US Duplex Venous Lower Ext Complete, Bilateral (10.15.24 @ 08:53) >  Subcutaneous edema in the left calf.    IMPRESSION:  No evidence of deep venous thrombosis in either lower extremity.      < end of copied text >      ECG [my interpretation]: 10/14/24 @ 19:58: Sinus bradycardia at 55, otherwise normal    TELEMETRY: Sinus bradycardia    ECHO: < from: TTE W or WO Ultrasound Enhancing Agent (10.15.24 @ 14:33) >  CONCLUSIONS:      1. Left ventricular cavity is normal in size. Left ventricular wall thickness is normal. Left ventricular systolic function is normal with an ejection fraction visually estimated at 50 to 55 %. There are no regional wall motion abnormalities seen.   2. There is mild (grade 1) left ventricular diastolic dysfunction.   3. Normal right ventricular cavity size, with normal wall thickness, and normal right ventricular systolic function. Tricuspid annular plane systolic excursion (TAPSE) is 2.0 cm (normal >=1.7 cm). Tricuspid annular tissue Doppler S' is 12.0 cm/s (normal >10 cm/s).   4. Normal left and right atrial size.   5. No significant valvular disease.   6. Pulmonary artery systolic pressure could not be estimated.   7. No pericardial effusion seen.   8. No prior echocardiogram is available for comparison.    < end of copied text >

## 2024-10-16 NOTE — PROGRESS NOTE ADULT - ATTENDING COMMENTS
Patient seen and examined this morning with Housestaff and Medical student    84 year old woman with hx including HTN, CAD, CHF here with complaints of worsening dependent extremity swelling, exercise intolerance and exertional dyspnea for the last few weeks. No prior visits here and no imaging for comparisons.  She was diagnosed with CHF at [facility] in 2020 and has been on Entresto since then- compliant. She denies any swelling problems before now and no exercise limitation.    Vital Signs Last 24 Hrs  T(C): 36.7 (16 Oct 2024 16:05), Max: 37 (16 Oct 2024 08:10)  T(F): 98.1 (16 Oct 2024 16:05), Max: 98.6 (16 Oct 2024 08:10)  HR: 47 (16 Oct 2024 16:05) (47 - 73)  BP: 179/56 (16 Oct 2024 16:05) (152/48 - 179/56)  RR: 17 (16 Oct 2024 16:05) (17 - 20)  SpO2: 100% (16 Oct 2024 16:05) (96% - 100%): room air          Labs:                        10.2   4.69  )-----------( 268      ( 16 Oct 2024 05:53 )             32.2   10-16    144  |  112[H]  |  18  ----------------------------<  95  3.6   |  28  |  1.39[H]    Ca    8.4      16 Oct 2024 05:53  Phos  3.4     10-16  Mg     2.1     10-16  TPro  6.4  /  Alb  3.3[L]  /  TBili  0.4  /  DBili  x   /  AST  25  /  ALT  29  /  AlkPhos  101  10-15    Xray Chest 1 View- PORTABLE-Urgent (10.14.24 @ 20:40)     Heart magnified by technique. Lungs are clear and chest is similar to September 30 this year.    IMPRESSION: No acute finding or change.     TTE W or WO Ultrasound Enhancing Agent (10.15.24 @ 14:33)     CONCLUSIONS:    1. Left ventricular cavity is normal in size. Left ventricular wall thickness is normal. Left ventricular systolic function is normal with an ejection fraction visually estimated at 50 to 55 %. There are no regional wall motion abnormalities seen.   2. There is mild (grade 1) left ventricular diastolic dysfunction.   3. Normal right ventricular cavity size, with normal wall thickness, and normal right ventricular systolic function. Tricuspid annular plane systolic excursion (TAPSE) is 2.0 cm (normal >=1.7 cm). Tricuspid annular tissue Doppler S' is 12.0 cm/s (normal >10 cm/s).   4. Normal left and right atrial size.   5. No significant valvular disease.   6. Pulmonary artery systolic pressure could not be estimated.   7. No pericardial effusion seen.   8. No prior echocardiogram is available for comparison.    NM Pulmonary Ventilation/Perfusion Scan (10.15.24 @ 13:22)   IMPRESSION: Low probability of pulmonary embolus.    A/P:  Lower extremity edema suspect Venous insufficiency and less likely Heart failure or Drug effect  Uncontrolled HTN due to inadequate medication   Elevated troponin likely Demand ischemia from elevated BP and   Hypertensive Kidney disease      Plan:  Continue Tele  Echo with no acute component of HF; normal EF; no valvular pathology  Discussed with patient at length; reviewed different anti HTN regimen; given prior Hx peripheral edema will avoid Ca channel blockers; avoid Beta blockers due to Bradycardia; Already on Entresto; at her age will avoid diuretic like HCTZ given risk of dehydration and Orthostatics although could be considered; I would prefer to give Hydralazine at much reduced dose 25 mg q 8 hrs very much less likely to cause peripheral edema ( patient reportedly had edema with Hydralazine but was on 75 mg); I also reviewed with her that unilateral leg edema is unlikely from BP meds.   Also counseled that elevated BP can precipitate sometimes fluid retention and Diastolic dysfunction     Requested Cardiology input given patient reservations about medications; Discussed with Dr. Rondon; Cardio agreed with Hydralazine plus Imdur but given patient concerns decided to place on Spironolactone. Monitor potassium closely on Spironolactone especially in the setting of patient being on Entresto    No DVT or PE; D/C Therapeutic Lovenox; Continue DVT ppx    Discussed with Patient at length as above  Discussed with PGY1/ PGY3/ Medical student and RN/ Cardio

## 2024-10-16 NOTE — PROGRESS NOTE ADULT - PROBLEM SELECTOR PLAN 3
hx of MI 10/2023 currently on GDMT with entresto  -CXR (wet read) grossly clear  -c/w home entresto  -s/p lasix 40mg IV in ED  -no respiratory distress, hold off on further diuresis for now  -Cardiology consult in AM  -f/u TTE --> check for signs of right heart strain hx of MI 10/2023 currently on GDMT with entresto  -CXR (wet read) grossly clear  -c/w home entresto  -s/p lasix 40mg IV in ED  -no respiratory distress, hold off on further diuresis for now  -Cardiology consult: Dr. Rondon recommends c/w entresto and nifedipine. If patient refuses nifedipine, hydralazine 25mg PO TID plus Imdur ER 30mg can be prescribed instead.  -TTE shows no evidence of right heart strain; EF 50-55% and mild LV diastolic dysfunction p/w Hbg 9.4, acute drop from 11.6 on 10/21  -maintain active type/screen and 2 large bore IVs  -transfuse to maintain hbg >7  -iron panel unremarkable  -b12, folate, and haptoglobin WNL  -retic count 1.0% WNL  - U/L

## 2024-10-16 NOTE — CONSULT NOTE ADULT - ASSESSMENT
84 F with ? 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    1. LE edema: No clinical suspicion for HF given lack of dyspnea, clear CXR, normal pro-BNP when adjusted for age.  -Consider peripheral venous etiology    2.  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 HTN: Continue Entresto, nifedipine (the latter can contribute to LE edema but she has been on it previously)  -Hydralazine by itself is contraindicated in CAD, but can be given together with Imdur if needed (hydralazine 25mg PO TID and Imdur ER 30mg)    4. Possible CAD: On aspirin and clopidogrel at home    ***Note that this is a preliminary note and any recommendations should NOT be carried out until this note is finalized. *** 84 F with ? 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    1. LE edema: No clinical suspicion for HF given lack of dyspnea, clear CXR, normal pro-BNP when adjusted for age.  -Consider peripheral venous etiology    2.  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 HTN: Continue Entresto, nifedipine    -Hydralazine by itself is contraindicated in CAD, but can be given together with Imdur if needed (hydralazine 25mg PO TID and Imdur ER 30mg)  -Since patient refusing above, can try spironolactone since potassium is normal and creatinine is improving.     4. CAD s/p OLLIE: On aspirin and clopidogrel at home, continue here    D/W medicine team

## 2024-10-17 ENCOUNTER — TRANSCRIPTION ENCOUNTER (OUTPATIENT)
Age: 84
End: 2024-10-17

## 2024-10-17 DIAGNOSIS — R01.1 CARDIAC MURMUR, UNSPECIFIED: ICD-10-CM

## 2024-10-17 PROBLEM — R09.89 BRUIT: Status: ACTIVE | Noted: 2024-10-17

## 2024-10-17 PROBLEM — I25.10 CAD (CORONARY ARTERY DISEASE): Status: ACTIVE | Noted: 2024-10-17

## 2024-10-17 LAB
ANION GAP SERPL CALC-SCNC: 5 MMOL/L — SIGNIFICANT CHANGE UP (ref 5–17)
BUN SERPL-MCNC: 19 MG/DL — HIGH (ref 7–18)
CALCIUM SERPL-MCNC: 8.3 MG/DL — LOW (ref 8.4–10.5)
CHLORIDE SERPL-SCNC: 114 MMOL/L — HIGH (ref 96–108)
CO2 SERPL-SCNC: 26 MMOL/L — SIGNIFICANT CHANGE UP (ref 22–31)
CREAT SERPL-MCNC: 1.44 MG/DL — HIGH (ref 0.5–1.3)
EGFR: 36 ML/MIN/1.73M2 — LOW
GLUCOSE SERPL-MCNC: 89 MG/DL — SIGNIFICANT CHANGE UP (ref 70–99)
HCT VFR BLD CALC: 30.4 % — LOW (ref 34.5–45)
HGB BLD-MCNC: 9.8 G/DL — LOW (ref 11.5–15.5)
MAGNESIUM SERPL-MCNC: 2.3 MG/DL — SIGNIFICANT CHANGE UP (ref 1.6–2.6)
MCHC RBC-ENTMCNC: 26.8 PG — LOW (ref 27–34)
MCHC RBC-ENTMCNC: 32.2 GM/DL — SIGNIFICANT CHANGE UP (ref 32–36)
MCV RBC AUTO: 83.1 FL — SIGNIFICANT CHANGE UP (ref 80–100)
NRBC # BLD: 0 /100 WBCS — SIGNIFICANT CHANGE UP (ref 0–0)
PHOSPHATE SERPL-MCNC: 3.7 MG/DL — SIGNIFICANT CHANGE UP (ref 2.5–4.5)
PLATELET # BLD AUTO: 261 K/UL — SIGNIFICANT CHANGE UP (ref 150–400)
POTASSIUM SERPL-MCNC: 3.7 MMOL/L — SIGNIFICANT CHANGE UP (ref 3.5–5.3)
POTASSIUM SERPL-SCNC: 3.7 MMOL/L — SIGNIFICANT CHANGE UP (ref 3.5–5.3)
RBC # BLD: 3.66 M/UL — LOW (ref 3.8–5.2)
RBC # FLD: 13.5 % — SIGNIFICANT CHANGE UP (ref 10.3–14.5)
SODIUM SERPL-SCNC: 145 MMOL/L — SIGNIFICANT CHANGE UP (ref 135–145)
WBC # BLD: 4.24 K/UL — SIGNIFICANT CHANGE UP (ref 3.8–10.5)
WBC # FLD AUTO: 4.24 K/UL — SIGNIFICANT CHANGE UP (ref 3.8–10.5)

## 2024-10-17 PROCEDURE — 99232 SBSQ HOSP IP/OBS MODERATE 35: CPT | Mod: GC

## 2024-10-17 RX ORDER — SACUBITRIL AND VALSARTAN 97; 103 MG/1; MG/1
1 TABLET, FILM COATED ORAL
Refills: 0 | Status: DISCONTINUED | OUTPATIENT
Start: 2024-10-17 | End: 2024-10-17

## 2024-10-17 RX ORDER — SACUBITRIL AND VALSARTAN 97; 103 MG/1; MG/1
1 TABLET, FILM COATED ORAL
Refills: 0 | Status: DISCONTINUED | OUTPATIENT
Start: 2024-10-17 | End: 2024-10-18

## 2024-10-17 RX ADMIN — Medication 5000 UNIT(S): at 06:18

## 2024-10-17 RX ADMIN — ATORVASTATIN CALCIUM 80 MILLIGRAM(S): 10 TABLET, FILM COATED ORAL at 22:30

## 2024-10-17 RX ADMIN — Medication 5000 UNIT(S): at 22:29

## 2024-10-17 RX ADMIN — SACUBITRIL AND VALSARTAN 1 TABLET(S): 97; 103 TABLET, FILM COATED ORAL at 12:06

## 2024-10-17 RX ADMIN — SPIRONOLACTONE 25 MILLIGRAM(S): 100 TABLET, FILM COATED ORAL at 06:18

## 2024-10-17 RX ADMIN — Medication 5000 UNIT(S): at 13:29

## 2024-10-17 RX ADMIN — Medication 2 TABLET(S): at 22:29

## 2024-10-17 RX ADMIN — Medication 20 MILLIGRAM(S): at 17:52

## 2024-10-17 RX ADMIN — Medication 20 MILLIGRAM(S): at 06:18

## 2024-10-17 NOTE — DISCHARGE NOTE NURSING/CASE MANAGEMENT/SOCIAL WORK - FINANCIAL ASSISTANCE
[facility] provides services at a reduced cost to those who are determined to be eligible through [facility]’s financial assistance program. Information regarding [facility]’s financial assistance program can be found by going to [email or web address] or by calling [phone].

## 2024-10-17 NOTE — DISCHARGE NOTE NURSING/CASE MANAGEMENT/SOCIAL WORK - PATIENT PORTAL LINK FT
You can access the FollowMyHealth Patient Portal offered by [facility] by registering at the following website: [email or web address]. By joining [facility]’s FollowMyHealth portal, you will also be able to view your health information using other applications (apps) compatible with our system.

## 2024-10-17 NOTE — PROGRESS NOTE ADULT - PROBLEM SELECTOR PLAN 2
p/w LLE leg swelling for 3 days  -+wilmar's sign, +erythema   -Wells score=1   -D-dimer 433 (normal for age)  -f/u LLE US doppler in s/o high clinical suspicion   -if positive f/u V/Q scan in s/o renal insufficiency  -c/w ASA, Plavix  -FD lovenox completed   -c/w heparin injectable 5000u Q8hrs p/w LLE leg swelling for 3 days  -+wilmar's sign, +erythema   -Wells score=1   -D-dimer 433 (normal for age)  -LLE US doppler negative for DVT   -V/Q scan low probability of PE  -FD lovenox completed   -c/w ASA, Plavix  -c/w heparin injectable 5000u Q8hrs

## 2024-10-17 NOTE — PROGRESS NOTE ADULT - PROBLEM SELECTOR PLAN 3
p/w Hbg 9.4, acute drop from 11.6 on 10/21  -maintain active type/screen and 2 large bore IVs  -transfuse to maintain hbg >7  -iron panel unremarkable  -b12, folate, and haptoglobin WNL  -retic count 1.0% WNL  - U/L

## 2024-10-17 NOTE — PROGRESS NOTE ADULT - PROBLEM SELECTOR PLAN 6
Pt w/ SCr 1.66 on admission  -Baseline SCr - unknown (1.58 on 10/1)  -likely CKD   -F/U Urine Lytes, calculate FeNa  -avoid nephrotoxins  -follow BMP daily  -SCr 1.39 this A.M.  -eGFR 37 this A.M. consistent with Stage 3b CKD  -patient counseled on avoiding NSAIDs accept for Tylenol Pt w/ SCr 1.66 on admission  -Baseline SCr - unknown (1.58 on 10/1)  -likely CKD   -avoid nephrotoxins  -patient counseled on avoiding NSAIDs accept for Tylenol  -follow BMP daily  -SCr 1.44 this A.M.  -F/U Urine Lytes, calculate FeNa

## 2024-10-17 NOTE — PROGRESS NOTE ADULT - PROBLEM SELECTOR PLAN 5
hx of CAD s/p stent on plavix, asa  -p/w troponin 70-->70.2 increased from 22.9 on 10/1  -repeat troponin down trended to 53.5  -no acute ischemic changes on EKG  -admit to telemetry   -c/w home meds  -cardiology consulted: Dr. Rondon hx of CAD s/p stent on plavix, asa  -p/w troponin 70-->70.2 increased from 22.9 on 10/1  -repeat troponin down trended to 53.5  -no acute ischemic changes on EKG  -admitted to telemetry   -cardiology consulted: Dr. Rondon  -c/w [facility]

## 2024-10-17 NOTE — PROGRESS NOTE ADULT - PROBLEM SELECTOR PLAN 4
hx of MI 10/2023 currently on GDMT with entresto  -CXR (wet read) grossly clear  -c/w home entresto  -s/p lasix 40mg IV in ED  -no respiratory distress, hold off on further diuresis for now  -Cardiology consult: Dr. Rondon recommends c/w entresto and nifedipine. If patient refuses nifedipine, hydralazine 25mg PO TID plus Imdur ER 30mg can be prescribed instead.  -TTE shows no evidence of right heart strain; EF 50-55% and mild LV diastolic dysfunction hx of MI 10/2023 currently on GDMT with entresto  -CXR (wet read) grossly clear  -s/p lasix 40mg IV in ED  -no respiratory distress, hold off on further diuresis for now  -Cardiology consult: Dr. Rondon recommends c/w entresto and nifedipine. If patient refuses nifedipine, hydralazine 25mg PO TID plus Imdur ER 30mg can be prescribed instead.  -TTE shows no evidence of right heart strain; EF 50-55% and mild LV diastolic dysfunction  -c/w home entresto

## 2024-10-17 NOTE — PROGRESS NOTE ADULT - PROBLEM SELECTOR PLAN 1
hx of HTN on nifedepine, hydralazine and entresto  -hold nifedipine for now in s/o leg swelling  -c/w entresto    Patient counselled extensively by attending and cardiology attending this morning about antihypertensive medications and their side effects. Both attendings explained rationale for management plan given pt history of LE swelling and bradycardia. Patient agreeable to beginning spironolactone for BP control and LE edema, which was started earlier this afternoon. However, this evening, patient's son is not agreeable to starting spironolactone stating that patient's legs will swell. More extensive counselling and education done for patient and patient's son, but ultimately all recommendations denied by son. Cardiology attending made aware. hx of HTN on nifedepine, hydralazine and entresto  -hold nifedipine for now in s/o leg swelling  -c/w entresto  -c/w spironolactone inpatient    Patient counselled extensively by attending and cardiology attending this morning about antihypertensive medications and their side effects. Both attendings explained rationale for management plan given pt history of LE swelling and bradycardia. Patient agreeable to beginning spironolactone for BP control and LE edema, which was started earlier this afternoon. However, this evening, patient's son is not agreeable to starting spironolactone stating that patient's legs will swell. More extensive counselling and education done for patient and patient's son, but ultimately all recommendations denied by son. Cardiology attending made aware.

## 2024-10-17 NOTE — PROGRESS NOTE ADULT - ATTENDING COMMENTS
Patient seen and examined this morning with Housestaff and Medical student    84 year old woman with hx including HTN, CAD, CHF here with complaints of worsening dependent extremity swelling, exercise intolerance and exertional dyspnea for the last few weeks. No prior visits here and no imaging for comparisons.  She was diagnosed with CHF at [facility] in 2020 and has been on Entresto since then- compliant. She denies any swelling problems before now and no exercise limitation.      Labs:                        9.8    4.24  )-----------( 261      ( 17 Oct 2024 06:13 )             30.4   10-17    145  |  114[H]  |  19[H]  ----------------------------<  89  3.7   |  26  |  1.44[H]    Ca    8.3[L]      17 Oct 2024 06:13  Phos  3.7     10-17  Mg     2.3     10-17    Xray Chest 1 View- PORTABLE-Urgent (10.14.24 @ 20:40)     IMPRESSION: No acute finding or change.     TTE W or WO Ultrasound Enhancing Agent (10.15.24 @ 14:33)     CONCLUSIONS:    1. Left ventricular cavity is normal in size. Left ventricular wall thickness is normal. Left ventricular systolic function is normal with an ejection fraction visually estimated at 50 to 55 %. There are no regional wall motion abnormalities seen.   2. There is mild (grade 1) left ventricular diastolic dysfunction.   3. Normal right ventricular cavity size, with normal wall thickness, and normal right ventricular systolic function. Tricuspid annular plane systolic excursion (TAPSE) is 2.0 cm (normal >=1.7 cm). Tricuspid annular tissue Doppler S' is 12.0 cm/s (normal >10 cm/s).   4. Normal left and right atrial size.   5. No significant valvular disease.   6. Pulmonary artery systolic pressure could not be estimated.   7. No pericardial effusion seen.   8. No prior echocardiogram is available for comparison.    NM Pulmonary Ventilation/Perfusion Scan (10.15.24 @ 13:22)   IMPRESSION: Low probability of pulmonary embolus.    A/P:  Lower extremity edema suspect Venous insufficiency and less likely Heart failure or Drug effect  Uncontrolled HTN due to inadequate medication   Elevated troponin likely Demand ischemia from elevated BP and   Hypertensive Kidney disease      Plan:  Continue Tele  Echo with no acute component of HF; normal EF; no valvular pathology  Discussed with patient at length; reviewed different anti HTN regimen; given prior Hx peripheral edema will avoid Ca channel blockers; avoid Beta blockers due to Bradycardia; Already on Entresto; at her age will avoid diuretic like HCTZ given risk of dehydration and Orthostatics although could be considered; I would prefer to give Hydralazine at much reduced dose 25 mg q 8 hrs very much less likely to cause peripheral edema ( patient reportedly had edema with Hydralazine but was on 75 mg); I also reviewed with her that unilateral leg edema is unlikely from BP meds.   Also counseled that elevated BP can precipitate sometimes fluid retention and Diastolic dysfunction     Requested Cardiology input given patient reservations about medications; Discussed with Dr. Rondon; Cardio agreed with Hydralazine plus Imdur but given patient concerns decided to place on Spironolactone. Monitor potassium closely on Spironolactone especially in the setting of patient being on Entresto    No DVT or PE; D/C Therapeutic Lovenox; Continue DVT ppx    Discussed with Patient at length as above  Discussed with PGY1/ PGY3/ Medical student and RN/ Cardio Patient seen and examined this morning around 11.50 AM; spoke with Son Kd later this evening    84 year old woman with hx including HTN, CAD, CHF here with complaints of worsening dependent extremity swelling, exercise intolerance and exertional dyspnea for the last few weeks. No prior visits here and no imaging for comparisons.  She was diagnosed with CHF at [facility] in 2020 and has been on Entresto since then- compliant. She denies any swelling problems before now and no exercise limitation.    Patient with nearly resolved leg edema refuses to take Hydralazine due to concern for leg edema; although was hesitant to take Spironolactone as suggested by Cards she took it this morning; no new complaints    P/E: as above  Psych: AAO x3  Neuro: No gross focal deficits; Power and sensation intact  CVS: S1S2 present, regular, trace edema  Resp: BLAE+, No wheeze or Rhonchi  GI: Soft, BS+, Non tender, non distended  Extr: No  calf tenderness B/L Lower extremities  Skin: Warm and moist without any rashes    Labs:                        9.8    4.24  )-----------( 261      ( 17 Oct 2024 06:13 )             30.4   10-17    145  |  114[H]  |  19[H]  ----------------------------<  89  3.7   |  26  |  1.44[H]  Ca    8.3[L]      17 Oct 2024 06:13  Phos  3.7     10-17  Mg     2.3     10-17    Xray Chest 1 View- PORTABLE-Urgent (10.14.24 @ 20:40)   IMPRESSION: No acute finding or change.     TTE W or WO Ultrasound Enhancing Agent (10.15.24 @ 14:33)     CONCLUSIONS:    1. Left ventricular cavity is normal in size. Left ventricular wall thickness is normal. Left ventricular systolic function is normal with an ejection fraction visually estimated at 50 to 55 %. There are no regional wall motion abnormalities seen.   2. There is mild (grade 1) left ventricular diastolic dysfunction.   3. Normal right ventricular cavity size, with normal wall thickness, and normal right ventricular systolic function. Tricuspid annular plane systolic excursion (TAPSE) is 2.0 cm (normal >=1.7 cm). Tricuspid annular tissue Doppler S' is 12.0 cm/s (normal >10 cm/s).   4. Normal left and right atrial size.   5. No significant valvular disease.   6. Pulmonary artery systolic pressure could not be estimated.   7. No pericardial effusion seen.   8. No prior echocardiogram is available for comparison.    NM Pulmonary Ventilation/Perfusion Scan (10.15.24 @ 13:22)   IMPRESSION: Low probability of pulmonary embolus.    A/P:  Lower extremity edema suspect Venous insufficiency and less likely Heart failure or Drug effect  Uncontrolled HTN due to inadequate medication   Elevated troponin likely Demand ischemia from elevated BP and   Hypertensive Kidney disease      Plan:  Echo with no acute component of HF; normal EF; no valvular pathology  BP slightly better; Continue Entresto; was held this morning for parameters but given n the afternoon; Continue Spironolactone  Monitor BP next 24 hrs; if elevated more than 160/90 will increase Spironolactone to 50 mg daily; Monitor renal fn and K closely   Discussed with patient and this evening with Son at bedside at length; reviewed different anti HTN regimen; given prior Hx peripheral edema will avoid Ca channel blockers; avoid Beta blockers due to Bradycardia;  Hydralazine at much reduced dose 25 mg q 8 hrs very much less likely to cause peripheral edema ( patient reportedly had edema with Hydralazine but was on 75 mg); not willing  Also counseled that elevated BP can precipitate sometimes fluid retention and Diastolic dysfunction   Cardiology follow up  No DVT or PE; D/C Therapeutic Lovenox; Continue DVT ppx    Discussed with Patient and Son; D/C Plan tomorrow if BP better controlled  Discussed with PGY1/ PGY3/ Medical student and RN/ Cardio

## 2024-10-17 NOTE — PROGRESS NOTE ADULT - ASSESSMENT
84F with PMH of HTN, HLD, CAD, and CHF presenting with unilateral left lower extremity swelling and right posteroinferior flank pain. Admitted to telemetry for acute anemia, INDIA, and exclusion of acute DVT/PE. Doppler ruled out DVT and V/Q scan showed low probability of PE. HTN and CHF will be medically managed. Pt will be discharged tomorrow if she remains stable.      84F with PMH of HTN, HLD, CAD, and CHF presenting with unilateral left lower extremity swelling and right posteroinferior flank pain. Admitted to telemetry for acute anemia, INDIA, and exclusion of acute DVT/PE. Doppler ruled out DVT and V/Q scan showed low probability of PE. HTN and CHF will be medically managed. Pt's BP continues to be high     84F with PMH of HTN, HLD, CAD, and CHF presenting with unilateral left lower extremity swelling and right posteroinferior flank pain. Admitted to telemetry for acute anemia, INDIA, and exclusion of acute DVT/PE. Doppler ruled out DVT and V/Q scan showed low probability of PE. HTN and CHF will be medically managed. Pt continues to be hypertensive (systolic 160s-170s), she will be evaluated for d/c tomorrow.

## 2024-10-17 NOTE — DISCHARGE NOTE NURSING/CASE MANAGEMENT/SOCIAL WORK - NSDCPEFALRISK_GEN_ALL_CORE
For information on Fall & Injury Prevention, visit: [email or web address] OR  [email or web address] OR  https://www.cdc.gov/steadi/patient.html

## 2024-10-17 NOTE — DISCHARGE NOTE NURSING/CASE MANAGEMENT/SOCIAL WORK - NSDCFUADDAPPT_GEN_ALL_CORE_FT
APPTS ARE READY TO BE MADE: [x] YES    Best Family or Patient Contact (if needed):    Additional Information about above appointments (if needed):    1: Cardiology Dr. Raza Rondon only please  2: PCP with Mariah  3:     Other comments or requests:

## 2024-10-17 NOTE — PROGRESS NOTE ADULT - SUBJECTIVE AND OBJECTIVE BOX
MS3 Progress Note discussed with PGY-1 and attending    PAGER #: [[phone]] TILL 5:00 PM  PLEASE CONTACT ON CALL TEAM:  - On Call Team (Please refer to Pranay) FROM 5:00 PM - 8:30PM  - Nightfloat Team FROM 8:30 -7:30 AM    INTERVAL HPI/OVERNIGHT EVENTS:   -     REVIEW OF SYSTEMS:  CONSTITUTIONAL: No fever, weight loss, or fatigue  RESPIRATORY: No cough, wheezing, chills or hemoptysis; No shortness of breath  CARDIOVASCULAR: No chest pain, palpitations, dizziness, or leg swelling  GASTROINTESTINAL: No abdominal pain. No nausea, vomiting, or hematemesis; No diarrhea or constipation. No melena or hematochezia.  GENITOURINARY: No dysuria or hematuria, urinary frequency  NEUROLOGICAL: No headaches, memory loss, loss of strength, numbness, or tremors  SKIN: No itching, burning, rashes, or lesions     MEDICATIONS  (STANDING):  aspirin enteric coated 81 milliGRAM(s) Oral daily  atorvastatin 80 milliGRAM(s) Oral at bedtime  clopidogrel Tablet 75 milliGRAM(s) Oral daily  famotidine    Tablet 20 milliGRAM(s) Oral two times a day  heparin   Injectable 5000 Unit(s) SubCutaneous every 8 hours  polyethylene glycol 3350 17 Gram(s) Oral daily  sacubitril 97 mG/valsartan 103 mG 1 Tablet(s) Oral two times a day  senna 2 Tablet(s) Oral at bedtime  spironolactone 25 milliGRAM(s) Oral daily    MEDICATIONS  (PRN):  acetaminophen     Tablet .. 650 milliGRAM(s) Oral every 6 hours PRN Temp greater or equal to 38C (100.4F), Mild Pain (1 - 3)      Vital Signs Last 24 Hrs  T(C): 36.5 (17 Oct 2024 11:06), Max: 36.7 (16 Oct 2024 16:05)  T(F): 97.7 (17 Oct 2024 11:06), Max: 98.1 (16 Oct 2024 16:05)  HR: 55 (17 Oct 2024 11:06) (47 - 55)  BP: 159/58 (17 Oct 2024 11:06) (159/58 - 179/56)  BP(mean): --  RR: 18 (17 Oct 2024 11:06) (17 - 18)  SpO2: 100% (17 Oct 2024 11:06) (99% - 100%)    Parameters below as of 17 Oct 2024 11:06  Patient On (Oxygen Delivery Method): room air        PHYSICAL EXAMINATION:  GENERAL: NAD, lying in bed comfortably. AAOx  NERVOUS SYSTEM: Speech clear. No deficits   HEAD:  Atraumatic, Normocephalic  EYES: EOMI, PERRLA, conjunctiva and sclera clear  ENT: Moist mucous membranes  NECK: Supple, No JVD, normal thyroid  CHEST/LUNG: Clear to auscultation bilaterally; no rales, rhonchi, wheezing, or rubs. No unlabored respirations   HEART: Regular rate and rhythm; No murmurs, rubs, or gallops  ABDOMEN: Bowel sounds present; Soft, nontender, nondistended.   EXTREMITIES:  2+ Peripheral Pulses, brisk capillary refill. No clubbing, cyanosis, or edema  MSK: FROM all 4 extremities, full and equal strength  SKIN: Warm dry. No rashes or lesions                          9.8    4.24  )-----------( 261      ( 17 Oct 2024 06:13 )             30.4     10-17    145  |  114[H]  |  19[H]  ----------------------------<  89  3.7   |  26  |  1.44[H]    Ca    8.3[L]      17 Oct 2024 06:13  Phos  3.7     10-17  Mg     2.3     10-17                  CAPILLARY BLOOD GLUCOSE          RADIOLOGY & ADDITIONAL TESTS:                   MS3 Progress Note discussed with PGY-1 and attending    PAGER #: [[phone]] TILL 5:00 PM  PLEASE CONTACT ON CALL TEAM:  - On Call Team (Please refer to Pranay) FROM 5:00 PM - 8:30PM  - Nightfloat Team FROM 8:30 -7:30 AM    INTERVAL HPI/OVERNIGHT EVENTS:   There were no acute events overnight. Patient seen at bedside, she has no complaints. Her right-sided flank pain has resolved, which she attributes to having passed gas.     REVIEW OF SYSTEMS:  CONSTITUTIONAL: No fever, chills or fatigue  RESPIRATORY: No cough, wheezing or shortness of breath  CARDIOVASCULAR: No chest pain, palpitations, or leg swelling  GASTROINTESTINAL: No abdominal pain, nausea or vomiting, diarrhea or constipation.  GENITOURINARY: No dysuria or urinary frequency  NEUROLOGICAL: No headaches, loss of strength, numbness/tingling      MEDICATIONS  (STANDING):  aspirin enteric coated 81 milliGRAM(s) Oral daily  atorvastatin 80 milliGRAM(s) Oral at bedtime  clopidogrel Tablet 75 milliGRAM(s) Oral daily  famotidine    Tablet 20 milliGRAM(s) Oral two times a day  heparin   Injectable 5000 Unit(s) SubCutaneous every 8 hours  polyethylene glycol 3350 17 Gram(s) Oral daily  sacubitril 97 mG/valsartan 103 mG 1 Tablet(s) Oral two times a day  senna 2 Tablet(s) Oral at bedtime  spironolactone 25 milliGRAM(s) Oral daily    MEDICATIONS  (PRN):  acetaminophen     Tablet .. 650 milliGRAM(s) Oral every 6 hours PRN Temp greater or equal to 38C (100.4F), Mild Pain (1 - 3)      Vital Signs Last 24 Hrs  T(C): 36.5 (17 Oct 2024 11:06), Max: 36.7 (16 Oct 2024 16:05)  T(F): 97.7 (17 Oct 2024 11:06), Max: 98.1 (16 Oct 2024 16:05)  HR: 55 (17 Oct 2024 11:06) (47 - 55)  BP: 159/58 (17 Oct 2024 11:06) (159/58 - 179/56)  BP(mean): --  RR: 18 (17 Oct 2024 11:06) (17 - 18)  SpO2: 100% (17 Oct 2024 11:06) (99% - 100%)    Parameters below as of 17 Oct 2024 11:06  Patient On (Oxygen Delivery Method): room air        PHYSICAL EXAMINATION:  GENERAL: NAD, lying in bed comfortably. AAOx3  NERVOUS SYSTEM: Speech clear. No focal neurological deficits   HEAD: Atraumatic, Normocephalic  EYES: EOMI, PERRLA, conjunctiva and sclera clear  ENT: Moist mucous membranes  NECK: Supple, No JVD, nonenlarged thyroid  CHEST/LUNG: Clear to auscultation bilaterally; no rales, rhonchi, wheezing, or crackles appreciated. No labored respirations, on room air  HEART: Bradycardic rate and regular rhythm appreciated; No murmurs, rubs, or gallops  ABDOMEN: Soft, nontender, nondistended.   EXTREMITIES: No clubbing, cyanosis, or edema  MSK: FROM all 4 extremities, full and equal strength  SKIN: Warm and dry. No rashes or lesions                          9.8    4.24  )-----------( 261      ( 17 Oct 2024 06:13 )             30.4     10-17    145  |  114[H]  |  19[H]  ----------------------------<  89  3.7   |  26  |  1.44[H]    Ca    8.3[L]      17 Oct 2024 06:13  Phos  3.7     10-17  Mg     2.3     10-17        RADIOLOGY & ADDITIONAL TESTS:  No new imaging results in the last 24 hours.

## 2024-10-18 VITALS
HEART RATE: 51 BPM | OXYGEN SATURATION: 99 % | RESPIRATION RATE: 17 BRPM | SYSTOLIC BLOOD PRESSURE: 165 MMHG | TEMPERATURE: 98 F | DIASTOLIC BLOOD PRESSURE: 53 MMHG

## 2024-10-18 LAB
ANION GAP SERPL CALC-SCNC: 5 MMOL/L — SIGNIFICANT CHANGE UP (ref 5–17)
BUN SERPL-MCNC: 18 MG/DL — SIGNIFICANT CHANGE UP (ref 7–18)
CALCIUM SERPL-MCNC: 8.8 MG/DL — SIGNIFICANT CHANGE UP (ref 8.4–10.5)
CHLORIDE SERPL-SCNC: 112 MMOL/L — HIGH (ref 96–108)
CO2 SERPL-SCNC: 26 MMOL/L — SIGNIFICANT CHANGE UP (ref 22–31)
CREAT SERPL-MCNC: 1.54 MG/DL — HIGH (ref 0.5–1.3)
EGFR: 33 ML/MIN/1.73M2 — LOW
GLUCOSE SERPL-MCNC: 96 MG/DL — SIGNIFICANT CHANGE UP (ref 70–99)
HCT VFR BLD CALC: 33.2 % — LOW (ref 34.5–45)
HGB BLD-MCNC: 10.6 G/DL — LOW (ref 11.5–15.5)
MAGNESIUM SERPL-MCNC: 2.3 MG/DL — SIGNIFICANT CHANGE UP (ref 1.6–2.6)
MCHC RBC-ENTMCNC: 26.8 PG — LOW (ref 27–34)
MCHC RBC-ENTMCNC: 31.9 GM/DL — LOW (ref 32–36)
MCV RBC AUTO: 84.1 FL — SIGNIFICANT CHANGE UP (ref 80–100)
NRBC # BLD: 0 /100 WBCS — SIGNIFICANT CHANGE UP (ref 0–0)
PHOSPHATE SERPL-MCNC: 3.7 MG/DL — SIGNIFICANT CHANGE UP (ref 2.5–4.5)
PLATELET # BLD AUTO: 290 K/UL — SIGNIFICANT CHANGE UP (ref 150–400)
POTASSIUM SERPL-MCNC: 3.6 MMOL/L — SIGNIFICANT CHANGE UP (ref 3.5–5.3)
POTASSIUM SERPL-SCNC: 3.6 MMOL/L — SIGNIFICANT CHANGE UP (ref 3.5–5.3)
RBC # BLD: 3.95 M/UL — SIGNIFICANT CHANGE UP (ref 3.8–5.2)
RBC # FLD: 13.2 % — SIGNIFICANT CHANGE UP (ref 10.3–14.5)
SODIUM SERPL-SCNC: 143 MMOL/L — SIGNIFICANT CHANGE UP (ref 135–145)
WBC # BLD: 4.96 K/UL — SIGNIFICANT CHANGE UP (ref 3.8–10.5)
WBC # FLD AUTO: 4.96 K/UL — SIGNIFICANT CHANGE UP (ref 3.8–10.5)

## 2024-10-18 RX ORDER — HYDRALAZINE HYDROCHLORIDE 100 MG/1
25 TABLET ORAL THREE TIMES A DAY
Refills: 0 | Status: DISCONTINUED | OUTPATIENT
Start: 2024-10-18 | End: 2024-10-18

## 2024-10-18 RX ORDER — ISOSORBIDE MONONITRATE 30 MG
30 TABLET, EXTENDED RELEASE 24 HR ORAL DAILY
Refills: 0 | Status: DISCONTINUED | OUTPATIENT
Start: 2024-10-18 | End: 2024-10-18

## 2024-10-18 RX ORDER — SPIRONOLACTONE 100 MG/1
1 TABLET, FILM COATED ORAL
Qty: 30 | Refills: 0
Start: 2024-10-18 | End: 2024-11-16

## 2024-10-18 RX ORDER — ISOSORBIDE MONONITRATE 30 MG
1 TABLET, EXTENDED RELEASE 24 HR ORAL
Qty: 30 | Refills: 0
Start: 2024-10-18 | End: 2024-11-16

## 2024-10-18 RX ORDER — HYDRALAZINE HYDROCHLORIDE 100 MG/1
1 TABLET ORAL
Qty: 90 | Refills: 0
Start: 2024-10-18 | End: 2024-11-16

## 2024-10-18 RX ADMIN — Medication 75 MILLIGRAM(S): at 12:32

## 2024-10-18 RX ADMIN — HYDRALAZINE HYDROCHLORIDE 25 MILLIGRAM(S): 100 TABLET ORAL at 02:01

## 2024-10-18 RX ADMIN — Medication 20 MILLIGRAM(S): at 17:30

## 2024-10-18 RX ADMIN — Medication 81 MILLIGRAM(S): at 12:33

## 2024-10-18 RX ADMIN — SPIRONOLACTONE 25 MILLIGRAM(S): 100 TABLET, FILM COATED ORAL at 06:01

## 2024-10-18 RX ADMIN — Medication 5000 UNIT(S): at 06:01

## 2024-10-18 RX ADMIN — Medication 30 MILLIGRAM(S): at 13:08

## 2024-10-18 RX ADMIN — Medication 5000 UNIT(S): at 13:07

## 2024-10-18 RX ADMIN — Medication 30 MILLIGRAM(S): at 02:01

## 2024-10-18 RX ADMIN — SACUBITRIL AND VALSARTAN 1 TABLET(S): 97; 103 TABLET, FILM COATED ORAL at 06:00

## 2024-10-18 RX ADMIN — HYDRALAZINE HYDROCHLORIDE 25 MILLIGRAM(S): 100 TABLET ORAL at 13:07

## 2024-10-18 RX ADMIN — Medication 20 MILLIGRAM(S): at 06:01

## 2024-10-18 RX ADMIN — SACUBITRIL AND VALSARTAN 1 TABLET(S): 97; 103 TABLET, FILM COATED ORAL at 17:29

## 2024-10-18 NOTE — CHART NOTE - NSCHARTNOTEFT_GEN_A_CORE
Notified by RN that patient sustaining elevated sbp 180 to 190s. Examined patient at bedside who was in no acute distress. Spoke with patient at length regarding options to treat her HTN. Given her bradycardia, cannot prescribe beta blocker. Patient still refusing nifedipine, but agreed to treatment with hydralazine and imdur recommendations per cardiology note. Will continue to monitor.    D/w PGY 3 Dr. Maurice
Patient seen and examined at bedside this morning. She notes her leg swelling is somewhat improved since coming in (had gotten lasix in ED). Denies any chest pain. However notes the past few days she noted feeling winded easily after walking   Vitals, labs reviewed as below. BP elevated, afebrile.  BNP noted 1500, Cr 1.49. LE doppler neg  Vital Signs Last 24 Hrs  T(C): 36.7 (15 Oct 2024 15:39), Max: 36.9 (15 Oct 2024 11:03)  T(F): 98.1 (15 Oct 2024 15:39), Max: 98.4 (15 Oct 2024 11:03)  HR: 49 (15 Oct 2024 15:39) (49 - 60)  BP: 176/62 (15 Oct 2024 15:39) (129/60 - 192/73)  BP(mean): --  RR: 17 (15 Oct 2024 15:39) (16 - 20)  SpO2: 98% (15 Oct 2024 15:39) (96% - 99%)    Parameters below as of 15 Oct 2024 15:39  Patient On (Oxygen Delivery Method): room air                          9.8    4.88  )-----------( 260      ( 15 Oct 2024 05:20 )             30.9        10-15    145  |  112[H]  |  19[H]  ----------------------------<  98  3.7   |  29  |  1.49[H]    Ca    8.2[L]      15 Oct 2024 05:20  Phos  3.1     10-15  Mg     2.1     10-15    TPro  6.4  /  Alb  3.3[L]  /  TBili  0.4  /  DBili  x   /  AST  25  /  ALT  29  /  AlkPhos  101  10-15         84 year old woman with hx including HTN, CAD, CHF here with complaints of worsening dependent extremity swelling, exercise intolerance and exertional dyspnea for the last few weeks, admitted for anemia and possible CHF exacerbation    #LE swelling   #Anemia   #Hx of CHF   #Hx of MI 10/2023   #CAD   #INDIA vs CKD   #HTN   #Anemia     -s/p lasix 40mg in ED   -f/u TTE   -V/q scan noted with low prob for PE   -c/w ASA, plavix   -cardiology consult    -med rec   -c/w entresto, nifedipine for now, pt states she feels unwell with hydralazine and does not wish to take it; unable to start bb for now given bradycardia noted on exam   -monitor Cr, avoid nsaids, nephrotoxins   -urine lytes for FeUrea   -monitor Cbc, f/u iron studies, b12, folate   -PT eval   -dvt ppx hsq

## 2024-10-22 ENCOUNTER — NON-APPOINTMENT (OUTPATIENT)
Age: 84
End: 2024-10-22

## 2024-10-22 ENCOUNTER — LABORATORY RESULT (OUTPATIENT)
Age: 84
End: 2024-10-22

## 2024-10-22 ENCOUNTER — APPOINTMENT (OUTPATIENT)
Dept: HEART AND VASCULAR | Facility: CLINIC | Age: 84
End: 2024-10-22

## 2024-10-22 ENCOUNTER — APPOINTMENT (OUTPATIENT)
Dept: HEART AND VASCULAR | Facility: CLINIC | Age: 84
End: 2024-10-22
Payer: MEDICARE

## 2024-10-22 VITALS
BODY MASS INDEX: 23.37 KG/M2 | SYSTOLIC BLOOD PRESSURE: 180 MMHG | HEIGHT: 62 IN | WEIGHT: 127 LBS | RESPIRATION RATE: 14 BRPM | DIASTOLIC BLOOD PRESSURE: 80 MMHG | HEART RATE: 57 BPM

## 2024-10-22 DIAGNOSIS — I10 ESSENTIAL (PRIMARY) HYPERTENSION: ICD-10-CM

## 2024-10-22 DIAGNOSIS — Z83.3 FAMILY HISTORY OF DIABETES MELLITUS: ICD-10-CM

## 2024-10-22 DIAGNOSIS — Z87.891 PERSONAL HISTORY OF NICOTINE DEPENDENCE: ICD-10-CM

## 2024-10-22 DIAGNOSIS — Z95.5 PRESENCE OF CORONARY ANGIOPLASTY IMPLANT AND GRAFT: ICD-10-CM

## 2024-10-22 DIAGNOSIS — Z82.3 FAMILY HISTORY OF STROKE: ICD-10-CM

## 2024-10-22 DIAGNOSIS — D64.9 ANEMIA, UNSPECIFIED: ICD-10-CM

## 2024-10-22 DIAGNOSIS — R09.89 OTHER SPECIFIED SYMPTOMS AND SIGNS INVOLVING THE CIRCULATORY AND RESPIRATORY SYSTEMS: ICD-10-CM

## 2024-10-22 DIAGNOSIS — I21.9 ACUTE MYOCARDIAL INFARCTION, UNSPECIFIED: ICD-10-CM

## 2024-10-22 DIAGNOSIS — I25.10 ATHEROSCLEROTIC HEART DISEASE OF NATIVE CORONARY ARTERY W/OUT ANGINA PECTORIS: ICD-10-CM

## 2024-10-22 DIAGNOSIS — Z86.011 PERSONAL HISTORY OF BENIGN NEOPLASM OF THE BRAIN: ICD-10-CM

## 2024-10-22 PROBLEM — I50.9 HEART FAILURE, UNSPECIFIED: Chronic | Status: ACTIVE | Noted: 2024-10-15

## 2024-10-22 PROCEDURE — 99214 OFFICE O/P EST MOD 30 MIN: CPT

## 2024-10-22 PROCEDURE — 93306 TTE W/DOPPLER COMPLETE: CPT

## 2024-10-22 PROCEDURE — ZZZZZ: CPT

## 2024-10-22 PROCEDURE — 93880 EXTRACRANIAL BILAT STUDY: CPT

## 2024-10-22 PROCEDURE — 93000 ELECTROCARDIOGRAM COMPLETE: CPT

## 2024-10-22 PROCEDURE — G2211 COMPLEX E/M VISIT ADD ON: CPT

## 2024-10-22 RX ORDER — ATORVASTATIN CALCIUM 80 MG/1
80 TABLET, FILM COATED ORAL
Refills: 0 | Status: ACTIVE | COMMUNITY

## 2024-10-22 RX ORDER — CLOPIDOGREL 75 MG/1
75 TABLET, FILM COATED ORAL DAILY
Refills: 0 | Status: ACTIVE | COMMUNITY

## 2024-10-22 RX ORDER — ISOSORBIDE MONONITRATE 30 MG
30 TABLET, EXTENDED RELEASE 24 HR ORAL DAILY
Refills: 0 | Status: ACTIVE | COMMUNITY

## 2024-10-22 RX ORDER — MULTIVIT-MIN/FOLIC/VIT K/LYCOP 400-300MCG
50 MCG TABLET ORAL DAILY
Refills: 0 | Status: ACTIVE | COMMUNITY

## 2024-10-22 RX ORDER — SACUBITRIL AND VALSARTAN 97; 103 MG/1; MG/1
97-103 TABLET, FILM COATED ORAL TWICE DAILY
Refills: 0 | Status: ACTIVE | COMMUNITY

## 2024-10-22 RX ORDER — HYDRALAZINE HYDROCHLORIDE 25 MG/1
25 TABLET ORAL 3 TIMES DAILY
Refills: 0 | Status: DISCONTINUED | COMMUNITY
End: 2024-10-22

## 2024-10-22 RX ORDER — AMLODIPINE BESYLATE 5 MG/1
5 TABLET ORAL DAILY
Qty: 90 | Refills: 3 | Status: ACTIVE | COMMUNITY
Start: 2024-10-22 | End: 1900-01-01

## 2024-10-22 RX ORDER — SPIRONOLACTONE 25 MG/1
25 TABLET ORAL DAILY
Refills: 0 | Status: ACTIVE | COMMUNITY

## 2024-10-24 LAB
25(OH)D3 SERPL-MCNC: 53 NG/ML
ALBUMIN SERPL ELPH-MCNC: 4.1 G/DL
ALP BLD-CCNC: 109 U/L
ALT SERPL-CCNC: 25 U/L
ANION GAP SERPL CALC-SCNC: 13 MMOL/L
AST SERPL-CCNC: 29 U/L
BASOPHILS # BLD AUTO: 0.02 K/UL
BASOPHILS NFR BLD AUTO: 0.4 %
BILIRUB SERPL-MCNC: 0.5 MG/DL
BUN SERPL-MCNC: 22 MG/DL
CALCIUM SERPL-MCNC: 8.7 MG/DL
CHLORIDE SERPL-SCNC: 108 MMOL/L
CHOLEST SERPL-MCNC: 104 MG/DL
CO2 SERPL-SCNC: 20 MMOL/L
CREAT SERPL-MCNC: 1.75 MG/DL
EGFR: 28 ML/MIN/1.73M2
EOSINOPHIL # BLD AUTO: 0.09 K/UL
EOSINOPHIL NFR BLD AUTO: 2 %
ESTIMATED AVERAGE GLUCOSE: 117 MG/DL
FOLATE SERPL-MCNC: 4 NG/ML
GLUCOSE SERPL-MCNC: 85 MG/DL
HBA1C MFR BLD HPLC: 5.7 %
HCT VFR BLD CALC: 31.8 %
HDLC SERPL-MCNC: 54 MG/DL
HGB BLD-MCNC: 9.5 G/DL
IMM GRANULOCYTES NFR BLD AUTO: 0.2 %
LDLC SERPL CALC-MCNC: 39 MG/DL
LYMPHOCYTES # BLD AUTO: 0.8 K/UL
LYMPHOCYTES NFR BLD AUTO: 17.5 %
MAN DIFF?: NORMAL
MCHC RBC-ENTMCNC: 26.2 PG
MCHC RBC-ENTMCNC: 29.9 GM/DL
MCV RBC AUTO: 87.8 FL
MONOCYTES # BLD AUTO: 0.31 K/UL
MONOCYTES NFR BLD AUTO: 6.8 %
NEUTROPHILS # BLD AUTO: 3.34 K/UL
NEUTROPHILS NFR BLD AUTO: 73.1 %
NONHDLC SERPL-MCNC: 50 MG/DL
PLATELET # BLD AUTO: 297 K/UL
POTASSIUM SERPL-SCNC: 4.8 MMOL/L
PROT SERPL-MCNC: 6.3 G/DL
RBC # BLD: 3.62 M/UL
RBC # FLD: 14.1 %
SODIUM SERPL-SCNC: 141 MMOL/L
T3 SERPL-MCNC: 134 NG/DL
T3FREE SERPL-MCNC: 2.71 PG/ML
T3RU NFR SERPL: 1.1 TBI
T4 FREE SERPL-MCNC: 1.1 NG/DL
T4 SERPL-MCNC: 8.7 UG/DL
TRIGL SERPL-MCNC: 46 MG/DL
TSH SERPL-ACNC: 7.54 UIU/ML
VIT B12 SERPL-MCNC: 734 PG/ML
WBC # FLD AUTO: 4.57 K/UL

## 2024-10-25 ENCOUNTER — APPOINTMENT (OUTPATIENT)
Dept: INTERNAL MEDICINE | Facility: CLINIC | Age: 84
End: 2024-10-25

## 2024-10-25 VITALS
SYSTOLIC BLOOD PRESSURE: 182 MMHG | BODY MASS INDEX: 23.37 KG/M2 | OXYGEN SATURATION: 98 % | WEIGHT: 127 LBS | HEIGHT: 62 IN | DIASTOLIC BLOOD PRESSURE: 65 MMHG | TEMPERATURE: 97.7 F | HEART RATE: 52 BPM

## 2024-10-25 PROCEDURE — G2211 COMPLEX E/M VISIT ADD ON: CPT

## 2024-10-25 PROCEDURE — 99212 OFFICE O/P EST SF 10 MIN: CPT

## 2024-11-13 ENCOUNTER — NON-APPOINTMENT (OUTPATIENT)
Age: 84
End: 2024-11-13

## 2024-11-13 ENCOUNTER — APPOINTMENT (OUTPATIENT)
Dept: CARDIOLOGY | Facility: CLINIC | Age: 84
End: 2024-11-13
Payer: MEDICARE

## 2024-11-13 VITALS
HEART RATE: 62 BPM | DIASTOLIC BLOOD PRESSURE: 69 MMHG | WEIGHT: 127 LBS | HEIGHT: 62 IN | SYSTOLIC BLOOD PRESSURE: 146 MMHG | OXYGEN SATURATION: 97 % | BODY MASS INDEX: 23.37 KG/M2

## 2024-11-13 PROCEDURE — 93000 ELECTROCARDIOGRAM COMPLETE: CPT

## 2024-11-13 PROCEDURE — 99214 OFFICE O/P EST MOD 30 MIN: CPT

## 2024-11-13 PROCEDURE — G2211 COMPLEX E/M VISIT ADD ON: CPT

## 2024-11-13 RX ORDER — HYDRALAZINE HYDROCHLORIDE 25 MG/1
25 TABLET ORAL TWICE DAILY
Qty: 180 | Refills: 3 | Status: ACTIVE | COMMUNITY
Start: 2024-11-13 | End: 1900-01-01

## 2024-11-26 PROCEDURE — 82962 GLUCOSE BLOOD TEST: CPT

## 2024-11-26 PROCEDURE — 83615 LACTATE (LD) (LDH) ENZYME: CPT

## 2024-11-26 PROCEDURE — 84484 ASSAY OF TROPONIN QUANT: CPT

## 2024-11-26 PROCEDURE — 85379 FIBRIN DEGRADATION QUANT: CPT

## 2024-11-26 PROCEDURE — 85027 COMPLETE CBC AUTOMATED: CPT

## 2024-11-26 PROCEDURE — 80053 COMPREHEN METABOLIC PANEL: CPT

## 2024-11-26 PROCEDURE — 96374 THER/PROPH/DIAG INJ IV PUSH: CPT

## 2024-11-26 PROCEDURE — 81003 URINALYSIS AUTO W/O SCOPE: CPT

## 2024-11-26 PROCEDURE — 80048 BASIC METABOLIC PNL TOTAL CA: CPT

## 2024-11-26 PROCEDURE — 85730 THROMBOPLASTIN TIME PARTIAL: CPT

## 2024-11-26 PROCEDURE — 36415 COLL VENOUS BLD VENIPUNCTURE: CPT

## 2024-11-26 PROCEDURE — 85045 AUTOMATED RETICULOCYTE COUNT: CPT

## 2024-11-26 PROCEDURE — 84100 ASSAY OF PHOSPHORUS: CPT

## 2024-11-26 PROCEDURE — 99285 EMERGENCY DEPT VISIT HI MDM: CPT

## 2024-11-26 PROCEDURE — 78582 LUNG VENTILAT&PERFUS IMAGING: CPT | Mod: MC

## 2024-11-26 PROCEDURE — 85025 COMPLETE CBC W/AUTO DIFF WBC: CPT

## 2024-11-26 PROCEDURE — 83735 ASSAY OF MAGNESIUM: CPT

## 2024-11-26 PROCEDURE — 82746 ASSAY OF FOLIC ACID SERUM: CPT

## 2024-11-26 PROCEDURE — 71045 X-RAY EXAM CHEST 1 VIEW: CPT

## 2024-11-26 PROCEDURE — 83550 IRON BINDING TEST: CPT

## 2024-11-26 PROCEDURE — 83540 ASSAY OF IRON: CPT

## 2024-11-26 PROCEDURE — 83880 ASSAY OF NATRIURETIC PEPTIDE: CPT

## 2024-11-26 PROCEDURE — 83010 ASSAY OF HAPTOGLOBIN QUANT: CPT

## 2024-11-26 PROCEDURE — 93005 ELECTROCARDIOGRAM TRACING: CPT

## 2024-11-26 PROCEDURE — 93970 EXTREMITY STUDY: CPT

## 2024-11-26 PROCEDURE — 85610 PROTHROMBIN TIME: CPT

## 2024-11-26 PROCEDURE — 82550 ASSAY OF CK (CPK): CPT

## 2024-11-26 PROCEDURE — 93306 TTE W/DOPPLER COMPLETE: CPT

## 2024-11-26 PROCEDURE — 82607 VITAMIN B-12: CPT

## 2024-11-26 PROCEDURE — 85384 FIBRINOGEN ACTIVITY: CPT

## 2025-01-08 ENCOUNTER — APPOINTMENT (OUTPATIENT)
Dept: CARDIOLOGY | Facility: CLINIC | Age: 85
End: 2025-01-08

## 2025-01-17 DIAGNOSIS — Z95.5 PRESENCE OF CORONARY ANGIOPLASTY IMPLANT AND GRAFT: ICD-10-CM

## 2025-01-22 ENCOUNTER — NON-APPOINTMENT (OUTPATIENT)
Age: 85
End: 2025-01-22

## 2025-01-22 ENCOUNTER — APPOINTMENT (OUTPATIENT)
Dept: CARDIOLOGY | Facility: CLINIC | Age: 85
End: 2025-01-22
Payer: MEDICARE

## 2025-01-22 VITALS
DIASTOLIC BLOOD PRESSURE: 69 MMHG | WEIGHT: 116 LBS | SYSTOLIC BLOOD PRESSURE: 175 MMHG | HEART RATE: 65 BPM | HEIGHT: 62 IN | BODY MASS INDEX: 21.35 KG/M2 | OXYGEN SATURATION: 99 % | TEMPERATURE: 98 F

## 2025-01-22 DIAGNOSIS — N18.9 CHRONIC KIDNEY DISEASE, UNSPECIFIED: ICD-10-CM

## 2025-01-22 DIAGNOSIS — R01.1 CARDIAC MURMUR, UNSPECIFIED: ICD-10-CM

## 2025-01-22 DIAGNOSIS — I25.10 ATHEROSCLEROTIC HEART DISEASE OF NATIVE CORONARY ARTERY W/OUT ANGINA PECTORIS: ICD-10-CM

## 2025-01-22 DIAGNOSIS — I21.9 ACUTE MYOCARDIAL INFARCTION, UNSPECIFIED: ICD-10-CM

## 2025-01-22 DIAGNOSIS — I10 ESSENTIAL (PRIMARY) HYPERTENSION: ICD-10-CM

## 2025-01-22 PROCEDURE — G2211 COMPLEX E/M VISIT ADD ON: CPT

## 2025-01-22 PROCEDURE — 99215 OFFICE O/P EST HI 40 MIN: CPT

## 2025-01-22 PROCEDURE — 93000 ELECTROCARDIOGRAM COMPLETE: CPT

## 2025-01-24 LAB
ALBUMIN SERPL ELPH-MCNC: 4.1 G/DL
ALP BLD-CCNC: 101 U/L
ALT SERPL-CCNC: 19 U/L
ANION GAP SERPL CALC-SCNC: 18 MMOL/L
AST SERPL-CCNC: 28 U/L
BILIRUB SERPL-MCNC: 0.4 MG/DL
BUN SERPL-MCNC: 22 MG/DL
CALCIUM SERPL-MCNC: 9.3 MG/DL
CHLORIDE SERPL-SCNC: 105 MMOL/L
CHOLEST SERPL-MCNC: 96 MG/DL
CO2 SERPL-SCNC: 17 MMOL/L
CREAT SERPL-MCNC: 1.61 MG/DL
EGFR: 31 ML/MIN/1.73M2
GLUCOSE SERPL-MCNC: 91 MG/DL
HDLC SERPL-MCNC: 49 MG/DL
LDLC SERPL CALC-MCNC: 34 MG/DL
NONHDLC SERPL-MCNC: 47 MG/DL
POTASSIUM SERPL-SCNC: 4.8 MMOL/L
PROT SERPL-MCNC: 6.8 G/DL
SODIUM SERPL-SCNC: 139 MMOL/L
TRIGL SERPL-MCNC: 53 MG/DL

## 2025-01-27 ENCOUNTER — APPOINTMENT (OUTPATIENT)
Dept: CARDIOLOGY | Facility: CLINIC | Age: 85
End: 2025-01-27
Payer: MEDICARE

## 2025-01-27 PROCEDURE — 36415 COLL VENOUS BLD VENIPUNCTURE: CPT

## 2025-02-01 LAB
ESTIMATED AVERAGE GLUCOSE: 111 MG/DL
HBA1C MFR BLD HPLC: 5.5 %

## 2025-02-12 ENCOUNTER — APPOINTMENT (OUTPATIENT)
Dept: CARDIOLOGY | Facility: CLINIC | Age: 85
End: 2025-02-12
Payer: MEDICARE

## 2025-02-12 ENCOUNTER — NON-APPOINTMENT (OUTPATIENT)
Age: 85
End: 2025-02-12

## 2025-02-12 VITALS
RESPIRATION RATE: 16 BRPM | HEIGHT: 62 IN | DIASTOLIC BLOOD PRESSURE: 76 MMHG | TEMPERATURE: 98.1 F | SYSTOLIC BLOOD PRESSURE: 152 MMHG | WEIGHT: 118 LBS | BODY MASS INDEX: 21.71 KG/M2 | OXYGEN SATURATION: 97 % | HEART RATE: 61 BPM

## 2025-02-12 DIAGNOSIS — R01.1 CARDIAC MURMUR, UNSPECIFIED: ICD-10-CM

## 2025-02-12 DIAGNOSIS — I10 ESSENTIAL (PRIMARY) HYPERTENSION: ICD-10-CM

## 2025-02-12 DIAGNOSIS — I21.9 ACUTE MYOCARDIAL INFARCTION, UNSPECIFIED: ICD-10-CM

## 2025-02-12 DIAGNOSIS — I25.10 ATHEROSCLEROTIC HEART DISEASE OF NATIVE CORONARY ARTERY W/OUT ANGINA PECTORIS: ICD-10-CM

## 2025-02-12 PROCEDURE — 99214 OFFICE O/P EST MOD 30 MIN: CPT

## 2025-02-12 PROCEDURE — 93000 ELECTROCARDIOGRAM COMPLETE: CPT

## 2025-02-12 RX ORDER — LOSARTAN POTASSIUM 25 MG/1
25 TABLET, FILM COATED ORAL DAILY
Qty: 1 | Refills: 1 | Status: ACTIVE | COMMUNITY
Start: 2025-02-12 | End: 1900-01-01

## 2025-02-12 RX ORDER — AMLODIPINE BESYLATE 5 MG/1
5 TABLET ORAL
Qty: 90 | Refills: 1 | Status: ACTIVE | COMMUNITY
Start: 2025-02-12 | End: 1900-01-01

## 2025-03-06 ENCOUNTER — NON-APPOINTMENT (OUTPATIENT)
Age: 85
End: 2025-03-06

## 2025-03-13 ENCOUNTER — APPOINTMENT (OUTPATIENT)
Dept: CARDIOLOGY | Facility: CLINIC | Age: 85
End: 2025-03-13

## 2025-03-17 ENCOUNTER — NON-APPOINTMENT (OUTPATIENT)
Age: 85
End: 2025-03-17

## 2025-03-17 ENCOUNTER — APPOINTMENT (OUTPATIENT)
Dept: CARDIOLOGY | Facility: CLINIC | Age: 85
End: 2025-03-17
Payer: MEDICARE

## 2025-03-17 VITALS
BODY MASS INDEX: 22.08 KG/M2 | WEIGHT: 120 LBS | TEMPERATURE: 98.2 F | HEART RATE: 64 BPM | DIASTOLIC BLOOD PRESSURE: 78 MMHG | SYSTOLIC BLOOD PRESSURE: 174 MMHG | HEIGHT: 62 IN | OXYGEN SATURATION: 98 % | RESPIRATION RATE: 16 BRPM

## 2025-03-17 DIAGNOSIS — I10 ESSENTIAL (PRIMARY) HYPERTENSION: ICD-10-CM

## 2025-03-17 DIAGNOSIS — I25.10 ATHEROSCLEROTIC HEART DISEASE OF NATIVE CORONARY ARTERY W/OUT ANGINA PECTORIS: ICD-10-CM

## 2025-03-17 DIAGNOSIS — R01.1 CARDIAC MURMUR, UNSPECIFIED: ICD-10-CM

## 2025-03-17 PROCEDURE — 99214 OFFICE O/P EST MOD 30 MIN: CPT

## 2025-03-17 PROCEDURE — 93000 ELECTROCARDIOGRAM COMPLETE: CPT

## 2025-03-20 ENCOUNTER — APPOINTMENT (OUTPATIENT)
Dept: INTERNAL MEDICINE | Facility: CLINIC | Age: 85
End: 2025-03-20

## 2025-03-20 VITALS — BODY MASS INDEX: 22.31 KG/M2 | WEIGHT: 122 LBS

## 2025-03-20 VITALS — SYSTOLIC BLOOD PRESSURE: 167 MMHG | DIASTOLIC BLOOD PRESSURE: 60 MMHG | HEART RATE: 64 BPM | TEMPERATURE: 98.4 F

## 2025-03-20 DIAGNOSIS — Z78.0 ENCOUNTER FOR SCREENING FOR OSTEOPOROSIS: ICD-10-CM

## 2025-03-20 DIAGNOSIS — Z13.820 ENCOUNTER FOR SCREENING FOR OSTEOPOROSIS: ICD-10-CM

## 2025-03-20 PROCEDURE — G2211 COMPLEX E/M VISIT ADD ON: CPT

## 2025-03-20 PROCEDURE — 99213 OFFICE O/P EST LOW 20 MIN: CPT

## 2025-04-01 PROBLEM — Z60.2 LIVES ALONE: Status: ACTIVE | Noted: 2025-04-01

## 2025-04-17 ENCOUNTER — APPOINTMENT (OUTPATIENT)
Dept: HEART AND VASCULAR | Facility: CLINIC | Age: 85
End: 2025-04-17

## 2025-04-21 ENCOUNTER — APPOINTMENT (OUTPATIENT)
Dept: CARDIOLOGY | Facility: CLINIC | Age: 85
End: 2025-04-21
Payer: MEDICARE

## 2025-04-21 ENCOUNTER — NON-APPOINTMENT (OUTPATIENT)
Age: 85
End: 2025-04-21

## 2025-04-21 VITALS — DIASTOLIC BLOOD PRESSURE: 72 MMHG | SYSTOLIC BLOOD PRESSURE: 200 MMHG

## 2025-04-21 VITALS
WEIGHT: 125 LBS | OXYGEN SATURATION: 96 % | HEART RATE: 56 BPM | BODY MASS INDEX: 23 KG/M2 | TEMPERATURE: 98.3 F | HEIGHT: 62 IN

## 2025-04-21 DIAGNOSIS — N18.9 CHRONIC KIDNEY DISEASE, UNSPECIFIED: ICD-10-CM

## 2025-04-21 PROCEDURE — 93000 ELECTROCARDIOGRAM COMPLETE: CPT

## 2025-04-21 PROCEDURE — 99215 OFFICE O/P EST HI 40 MIN: CPT

## 2025-04-22 ENCOUNTER — RX RENEWAL (OUTPATIENT)
Age: 85
End: 2025-04-22

## 2025-04-22 RX ORDER — ISOSORBIDE MONONITRATE 60 MG/1
60 TABLET, EXTENDED RELEASE ORAL
Qty: 90 | Refills: 0 | Status: ACTIVE | COMMUNITY
Start: 2025-04-21 | End: 1900-01-01

## 2025-04-24 ENCOUNTER — APPOINTMENT (OUTPATIENT)
Dept: CARDIOLOGY | Facility: CLINIC | Age: 85
End: 2025-04-24

## 2025-04-24 ENCOUNTER — APPOINTMENT (OUTPATIENT)
Dept: HEART AND VASCULAR | Facility: CLINIC | Age: 85
End: 2025-04-24

## 2025-04-30 ENCOUNTER — NON-APPOINTMENT (OUTPATIENT)
Age: 85
End: 2025-04-30

## 2025-04-30 ENCOUNTER — LABORATORY RESULT (OUTPATIENT)
Age: 85
End: 2025-04-30

## 2025-04-30 ENCOUNTER — APPOINTMENT (OUTPATIENT)
Dept: HEART AND VASCULAR | Facility: CLINIC | Age: 85
End: 2025-04-30
Payer: MEDICARE

## 2025-04-30 VITALS
HEIGHT: 62 IN | WEIGHT: 122 LBS | RESPIRATION RATE: 14 BRPM | DIASTOLIC BLOOD PRESSURE: 60 MMHG | BODY MASS INDEX: 22.45 KG/M2 | HEART RATE: 60 BPM | SYSTOLIC BLOOD PRESSURE: 160 MMHG

## 2025-04-30 DIAGNOSIS — D64.9 ANEMIA, UNSPECIFIED: ICD-10-CM

## 2025-04-30 DIAGNOSIS — Z95.5 PRESENCE OF CORONARY ANGIOPLASTY IMPLANT AND GRAFT: ICD-10-CM

## 2025-04-30 DIAGNOSIS — I25.10 ATHEROSCLEROTIC HEART DISEASE OF NATIVE CORONARY ARTERY W/OUT ANGINA PECTORIS: ICD-10-CM

## 2025-04-30 DIAGNOSIS — I10 ESSENTIAL (PRIMARY) HYPERTENSION: ICD-10-CM

## 2025-04-30 DIAGNOSIS — N18.9 CHRONIC KIDNEY DISEASE, UNSPECIFIED: ICD-10-CM

## 2025-04-30 PROCEDURE — 99214 OFFICE O/P EST MOD 30 MIN: CPT

## 2025-04-30 PROCEDURE — 93000 ELECTROCARDIOGRAM COMPLETE: CPT

## 2025-04-30 PROCEDURE — G2211 COMPLEX E/M VISIT ADD ON: CPT

## 2025-04-30 PROCEDURE — 36415 COLL VENOUS BLD VENIPUNCTURE: CPT

## 2025-04-30 RX ORDER — AMLODIPINE BESYLATE 2.5 MG/1
2.5 TABLET ORAL DAILY
Qty: 30 | Refills: 4 | Status: ACTIVE | COMMUNITY
Start: 2025-04-30 | End: 1900-01-01

## 2025-04-30 RX ORDER — HYDRALAZINE HYDROCHLORIDE 50 MG/1
50 TABLET ORAL 3 TIMES DAILY
Refills: 0 | Status: ACTIVE | COMMUNITY

## 2025-05-01 LAB
25(OH)D3 SERPL-MCNC: 52.3 NG/ML
ALBUMIN SERPL ELPH-MCNC: 3.9 G/DL
ALP BLD-CCNC: 109 U/L
ALT SERPL-CCNC: 32 U/L
ANION GAP SERPL CALC-SCNC: 13 MMOL/L
AST SERPL-CCNC: 25 U/L
BASOPHILS # BLD AUTO: 0.02 K/UL
BASOPHILS NFR BLD AUTO: 0.2 %
BILIRUB SERPL-MCNC: 0.5 MG/DL
BUN SERPL-MCNC: 33 MG/DL
CALCIUM SERPL-MCNC: 8.4 MG/DL
CHLORIDE SERPL-SCNC: 107 MMOL/L
CHOLEST SERPL-MCNC: 96 MG/DL
CO2 SERPL-SCNC: 22 MMOL/L
CREAT SERPL-MCNC: 1.53 MG/DL
EGFRCR SERPLBLD CKD-EPI 2021: 33 ML/MIN/1.73M2
EOSINOPHIL # BLD AUTO: 0.22 K/UL
EOSINOPHIL NFR BLD AUTO: 2.6 %
ESTIMATED AVERAGE GLUCOSE: 108 MG/DL
FOLATE SERPL-MCNC: 8.4 NG/ML
GLUCOSE SERPL-MCNC: 99 MG/DL
HBA1C MFR BLD HPLC: 5.4 %
HCT VFR BLD CALC: 29.2 %
HDLC SERPL-MCNC: 53 MG/DL
HGB BLD-MCNC: 8.5 G/DL
IMM GRANULOCYTES NFR BLD AUTO: 0.2 %
LDLC SERPL-MCNC: 30 MG/DL
LYMPHOCYTES # BLD AUTO: 1.39 K/UL
LYMPHOCYTES NFR BLD AUTO: 16.6 %
MAN DIFF?: NORMAL
MCHC RBC-ENTMCNC: 23.5 PG
MCHC RBC-ENTMCNC: 29.1 G/DL
MCV RBC AUTO: 80.7 FL
MONOCYTES # BLD AUTO: 0.51 K/UL
MONOCYTES NFR BLD AUTO: 6.1 %
NEUTROPHILS # BLD AUTO: 6.23 K/UL
NEUTROPHILS NFR BLD AUTO: 74.3 %
NONHDLC SERPL-MCNC: 42 MG/DL
PLATELET # BLD AUTO: 352 K/UL
POTASSIUM SERPL-SCNC: 3.9 MMOL/L
PROT SERPL-MCNC: 6.6 G/DL
RBC # BLD: 3.62 M/UL
RBC # FLD: 19 %
SODIUM SERPL-SCNC: 142 MMOL/L
T3 SERPL-MCNC: 116 NG/DL
T3FREE SERPL-MCNC: 3.05 PG/ML
T3RU NFR SERPL: 1 TBI
T4 FREE SERPL-MCNC: 1.2 NG/DL
T4 SERPL-MCNC: 8 UG/DL
TRIGL SERPL-MCNC: 48 MG/DL
TSH SERPL-ACNC: 5.05 UIU/ML
VIT B12 SERPL-MCNC: 1275 PG/ML
WBC # FLD AUTO: 8.39 K/UL

## 2025-05-02 LAB
FERRITIN SERPL-MCNC: 164 NG/ML
IRON SATN MFR SERPL: 13 %
IRON SERPL-MCNC: 37 UG/DL
RBC # BLD: 3.64 M/UL
RETICS # AUTO: 1.6 %
RETICS AGGREG/RBC NFR: 56.8 K/UL
TIBC SERPL-MCNC: 289 UG/DL
UIBC SERPL-MCNC: 251 UG/DL

## 2025-05-27 ENCOUNTER — APPOINTMENT (OUTPATIENT)
Dept: HEART AND VASCULAR | Facility: CLINIC | Age: 85
End: 2025-05-27

## 2025-05-27 ENCOUNTER — NON-APPOINTMENT (OUTPATIENT)
Age: 85
End: 2025-05-27

## 2025-06-11 ENCOUNTER — APPOINTMENT (OUTPATIENT)
Dept: INTERNAL MEDICINE | Facility: CLINIC | Age: 85
End: 2025-06-11

## 2025-06-11 VITALS
BODY MASS INDEX: 21.53 KG/M2 | SYSTOLIC BLOOD PRESSURE: 178 MMHG | HEART RATE: 58 BPM | DIASTOLIC BLOOD PRESSURE: 66 MMHG | WEIGHT: 117 LBS | OXYGEN SATURATION: 100 % | HEIGHT: 62 IN | TEMPERATURE: 98.9 F

## 2025-06-11 PROCEDURE — 99214 OFFICE O/P EST MOD 30 MIN: CPT

## 2025-06-11 PROCEDURE — 36415 COLL VENOUS BLD VENIPUNCTURE: CPT

## 2025-06-11 PROCEDURE — G2211 COMPLEX E/M VISIT ADD ON: CPT

## 2025-06-11 RX ORDER — CLONIDINE HYDROCHLORIDE 0.1 MG/1
0.1 TABLET ORAL DAILY
Qty: 30 | Refills: 1 | Status: ACTIVE | COMMUNITY
Start: 2025-06-11 | End: 1900-01-01

## 2025-06-11 RX ORDER — OLMESARTAN MEDOXOMIL 40 MG/1
40 TABLET, FILM COATED ORAL DAILY
Refills: 0 | Status: ACTIVE | COMMUNITY

## 2025-06-14 NOTE — PATIENT PROFILE ADULT - CAREGIVER RELATION TO PATIENT
[FreeTextEntry6] : bad cough, runny nose eczema on face and body  was in [place]  has a cold now  son

## 2025-06-17 NOTE — H&P ADULT - NSHPPHYSICALEXAM_GEN_ALL_CORE
Subjective:      Patient ID:  Amanda Clark is a 66 y.o. female who presents for   Chief Complaint   Patient presents with    Skin Check     TBSC     LOV 03/25/2024  NUB, ak, sk  Cleared toenails with weekly diflucan and topical from [facility]    Patient is coming in for TBSC  C/o spots by left eye  C/o skin tag on right eyelid  C/o spot on lower cheek    Lost 60lbs on wegovy     Last Path:  Skin, left cheek, shave biopsy:   -VERRUCA VULGARIS     Derm hx:  Denies phx of NMSC   Denies fhx of MM      Current Outpatient Medications:   ·  mszayau-iigpfdx-sbinsig-terbin (DIFMETIOXRIME) 4-2-1-4 % Soln, Apply to diseased nail once daily, Disp: 15 g, Rfl: 5  ·  fluconazole (DIFLUCAN) 200 MG Tab, Take 1 tablet by mouth once a week, Disp: 8 tablet, Rfl: 0  ·  semaglutide, weight loss, (WEGOVY) 2.4 mg/0.75 mL PnIj, INJECT 2.4MG INTO THE SKIN EVERY 7 DAYS, Disp: 4 mL, Rfl: 3  ·  timolol maleate 0.5% (TIMOPTIC) 0.5 % Drop, Place 1 drop into both eyes 2 (two) times daily., Disp: , Rfl:   ·  timolol maleate 0.5% (TIMOPTIC-XE) 0.5 % SolG, Place 1 drop into both eyes., Disp: , Rfl:          Review of Systems   Constitutional:  Negative for fever, chills and fatigue.   Respiratory:  Negative for cough and shortness of breath.    Skin:  Positive for daily sunscreen use and activity-related sunscreen use. Negative for itching, rash, dry skin, recent sunburn and dry lips.   Hematologic/Lymphatic: Does not bruise/bleed easily.       Objective:   Physical Exam   Constitutional: She appears well-developed and well-nourished. No distress.   Neurological: She is alert and oriented to person, place, and time. She is not disoriented.   Psychiatric: She has a normal mood and affect.   Skin:   Areas Examined (abnormalities noted in diagram):   Scalp / Hair Palpated and Inspected  Head / Face Inspection Performed  Neck Inspection Performed  Chest / Axilla Inspection Performed  Abdomen Inspection Performed  Genitals / Buttocks / Groin  Inspection Performed  Back Inspection Performed  RUE Inspected  LUE Inspection Performed  RLE Inspected  LLE Inspection Performed  Nails and Digits Inspection Performed                     Diagram Legend     Erythematous scaling macule/papule c/w actinic keratosis       Vascular papule c/w angioma      Pigmented verrucoid papule/plaque c/w seborrheic keratosis      Yellow umbilicated papule c/w sebaceous hyperplasia      Irregularly shaped tan macule c/w lentigo     1-2 mm smooth white papules consistent with Milia      Movable subcutaneous cyst with punctum c/w epidermal inclusion cyst      Subcutaneous movable cyst c/w pilar cyst      Firm pink to brown papule c/w dermatofibroma      Pedunculated fleshy papule(s) c/w skin tag(s)      Evenly pigmented macule c/w junctional nevus     Mildly variegated pigmented, slightly irregular-bordered macule c/w mildly atypical nevus      Flesh colored to evenly pigmented papule c/w intradermal nevus       Pink pearly papule/plaque c/w basal cell carcinoma      Erythematous hyperkeratotic cursted plaque c/w SCC      Surgical scar with no sign of skin cancer recurrence      Open and closed comedones      Inflammatory papules and pustules      Verrucoid papule consistent consistent with wart     Erythematous eczematous patches and plaques     Dystrophic onycholytic nail with subungual debris c/w onychomycosis     Umbilicated papule    Erythematous-base heme-crusted tan verrucoid plaque consistent with inflamed seborrheic keratosis     Erythematous Silvery Scaling Plaque c/w Psoriasis     See annotation      Assessment / Plan:        Actinic keratoses  Cryosurgery Procedure Note    Verbal consent from the patient is obtained and the patient is aware of the precancerous quality and need for treatment of these lesions. Liquid nitrogen cryosurgery is applied to the 2 actinic keratoses, as detailed in the physical exam, to produce a freeze injury. The patient is aware that blisters  may form and is instructed on wound care with gentle cleansing and use of vaseline ointment to keep moist until healed. The patient is supplied a handout on cryosurgery and is instructed to call if lesions do not completely resolve.    Discussed efudex to upper lip.   Patient fears robust inflammatory reaction.  Sample klisyri to upper lip and R upper eyebrow (5 consecutive nights)    Seborrheic keratoses  These are benign inherited growths without a malignant potential. Reassurance given to patient. No treatment is necessary.     Solar lentigo  This is a benign hyperpigmented sun induced lesion. Daily sun protection will reduce the number of new lesions. Treatment of these benign lesions are considered cosmetic.    Cherry angioma  This is a benign vascular lesion. Reassurance given. No treatment required.     Actinic skin damage  Patient instructed in importance in daily broad spectrum sun protection of at least spf 30. Mineral sunscreen ingredients preferred (Zinc +/- Titanium) and can be found OTC.   Patient encouraged to wear hat for all outdoor exposure.   Also discussed sun avoidance and use of protective clothing.             Follow up in about 1 year (around 6/17/2026), or if symptoms worsen or fail to improve.   LOS:     VITALS:   T(C): 36.7 (10-15-24 @ 07:50), Max: 36.9 (10-14-24 @ 18:10)  HR: 51 (10-15-24 @ 07:50) (50 - 68)  BP: 187/63 (10-15-24 @ 07:50) (180/76 - 192/73)  RR: 20 (10-15-24 @ 07:50) (16 - 20)  SpO2: 97% (10-15-24 @ 07:50) (96% - 97%)    GENERAL: NAD, lying in bed comfortably  HEAD:  Atraumatic, Normocephalic  EYES: EOMI, PERRLA, conjunctiva and sclera clear  ENT: Moist mucous membranes  NECK: Supple, No JVD  CHEST/LUNG: trace bibasilar rales; No rhonchi, wheezing, or rubs. Unlabored respirations  HEART: Regular rate and rhythm; No murmurs, rubs, or gallops  ABDOMEN: BSx4; Soft, nontender, nondistended  EXTREMITIES:  2+ Peripheral Pulses, brisk capillary refill. No clubbing, cyanosis, obvious size discrepancy between RLE and LLE with L larger that R, +homans sign on L, negative wilmar's on L, LLE significantly warmer to touch compared to RLE  NERVOUS SYSTEM:  A&Ox3, no focal deficits   SKIN: No rashes or lesions

## 2025-06-19 PROBLEM — R71.0 DROP IN HEMOGLOBIN: Status: ACTIVE | Noted: 2025-06-19

## 2025-06-19 LAB
BASOPHILS # BLD AUTO: 0.02 K/UL
BASOPHILS NFR BLD AUTO: 0.4 %
CRP SERPL HS-MCNC: 0.69 MG/L
EOSINOPHIL # BLD AUTO: 0.1 K/UL
EOSINOPHIL NFR BLD AUTO: 1.8 %
ERYTHROCYTE [SEDIMENTATION RATE] IN BLOOD BY WESTERGREN METHOD: 8 MM/HR
HAPTOGLOB SERPL-MCNC: 80 MG/DL
HCT VFR BLD CALC: 30.1 %
HGB BLD-MCNC: 9 G/DL
IMM GRANULOCYTES NFR BLD AUTO: 0 %
LDH SERPL-CCNC: 207 U/L
LYMPHOCYTES # BLD AUTO: 1.32 K/UL
LYMPHOCYTES NFR BLD AUTO: 23.4 %
MAN DIFF?: NORMAL
MCHC RBC-ENTMCNC: 23.3 PG
MCHC RBC-ENTMCNC: 29.9 G/DL
MCV RBC AUTO: 77.8 FL
MONOCYTES # BLD AUTO: 0.39 K/UL
MONOCYTES NFR BLD AUTO: 6.9 %
NEUTROPHILS # BLD AUTO: 3.82 K/UL
NEUTROPHILS NFR BLD AUTO: 67.5 %
PLATELET # BLD AUTO: 305 K/UL
RBC # BLD: 3.87 M/UL
RBC # FLD: 18.2 %
WBC # FLD AUTO: 5.65 K/UL

## 2025-06-26 ENCOUNTER — APPOINTMENT (OUTPATIENT)
Dept: INTERNAL MEDICINE | Facility: CLINIC | Age: 85
End: 2025-06-26

## 2025-07-16 ENCOUNTER — RX RENEWAL (OUTPATIENT)
Age: 85
End: 2025-07-16

## 2025-07-30 ENCOUNTER — RX RENEWAL (OUTPATIENT)
Age: 85
End: 2025-07-30

## 2025-08-01 ENCOUNTER — APPOINTMENT (OUTPATIENT)
Dept: INTERNAL MEDICINE | Facility: CLINIC | Age: 85
End: 2025-08-01
Payer: MEDICARE

## 2025-08-01 VITALS — DIASTOLIC BLOOD PRESSURE: 66 MMHG | SYSTOLIC BLOOD PRESSURE: 150 MMHG

## 2025-08-01 VITALS
TEMPERATURE: 97.3 F | SYSTOLIC BLOOD PRESSURE: 173 MMHG | HEART RATE: 53 BPM | BODY MASS INDEX: 21.53 KG/M2 | DIASTOLIC BLOOD PRESSURE: 61 MMHG | WEIGHT: 117 LBS | HEIGHT: 62 IN | OXYGEN SATURATION: 99 %

## 2025-08-01 PROCEDURE — 99213 OFFICE O/P EST LOW 20 MIN: CPT

## 2025-08-01 PROCEDURE — G2211 COMPLEX E/M VISIT ADD ON: CPT

## 2025-08-01 RX ORDER — CHLORTHALIDONE 25 MG/1
25 TABLET ORAL DAILY
Refills: 0 | Status: ACTIVE | COMMUNITY

## 2025-08-04 ENCOUNTER — RX RENEWAL (OUTPATIENT)
Age: 85
End: 2025-08-04

## 2025-08-04 RX ORDER — VALSARTAN 160 MG/1
160 TABLET, COATED ORAL DAILY
Qty: 90 | Refills: 0 | Status: ACTIVE | COMMUNITY
Start: 2025-08-01 | End: 1900-01-01

## 2025-08-05 ENCOUNTER — RX RENEWAL (OUTPATIENT)
Age: 85
End: 2025-08-05

## 2025-08-11 ENCOUNTER — NON-APPOINTMENT (OUTPATIENT)
Age: 85
End: 2025-08-11

## 2025-08-11 ENCOUNTER — LABORATORY RESULT (OUTPATIENT)
Age: 85
End: 2025-08-11

## 2025-08-11 ENCOUNTER — APPOINTMENT (OUTPATIENT)
Dept: HEART AND VASCULAR | Facility: CLINIC | Age: 85
End: 2025-08-11

## 2025-08-11 ENCOUNTER — APPOINTMENT (OUTPATIENT)
Dept: HEART AND VASCULAR | Facility: CLINIC | Age: 85
End: 2025-08-11
Payer: MEDICARE

## 2025-08-11 VITALS
RESPIRATION RATE: 14 BRPM | WEIGHT: 118 LBS | HEIGHT: 62 IN | SYSTOLIC BLOOD PRESSURE: 180 MMHG | DIASTOLIC BLOOD PRESSURE: 74 MMHG | BODY MASS INDEX: 21.71 KG/M2 | HEART RATE: 43 BPM

## 2025-08-11 DIAGNOSIS — I21.9 ACUTE MYOCARDIAL INFARCTION, UNSPECIFIED: ICD-10-CM

## 2025-08-11 DIAGNOSIS — I25.10 ATHEROSCLEROTIC HEART DISEASE OF NATIVE CORONARY ARTERY W/OUT ANGINA PECTORIS: ICD-10-CM

## 2025-08-11 DIAGNOSIS — I10 ESSENTIAL (PRIMARY) HYPERTENSION: ICD-10-CM

## 2025-08-11 DIAGNOSIS — I49.9 CARDIAC ARRHYTHMIA, UNSPECIFIED: ICD-10-CM

## 2025-08-11 DIAGNOSIS — D64.9 ANEMIA, UNSPECIFIED: ICD-10-CM

## 2025-08-11 DIAGNOSIS — Z95.5 PRESENCE OF CORONARY ANGIOPLASTY IMPLANT AND GRAFT: ICD-10-CM

## 2025-08-11 PROCEDURE — 99214 OFFICE O/P EST MOD 30 MIN: CPT

## 2025-08-11 PROCEDURE — 36415 COLL VENOUS BLD VENIPUNCTURE: CPT

## 2025-08-11 PROCEDURE — 93000 ELECTROCARDIOGRAM COMPLETE: CPT

## 2025-08-11 PROCEDURE — G2211 COMPLEX E/M VISIT ADD ON: CPT

## 2025-08-11 RX ORDER — ATORVASTATIN CALCIUM 40 MG/1
40 TABLET, FILM COATED ORAL
Refills: 0 | Status: ACTIVE | COMMUNITY

## 2025-08-12 LAB
25(OH)D3 SERPL-MCNC: 55.6 NG/ML
ALBUMIN SERPL ELPH-MCNC: 4.1 G/DL
ALP BLD-CCNC: 113 U/L
ALT SERPL-CCNC: 22 U/L
ANION GAP SERPL CALC-SCNC: 14 MMOL/L
AST SERPL-CCNC: 29 U/L
BASOPHILS # BLD AUTO: 0.03 K/UL
BASOPHILS NFR BLD AUTO: 0.5 %
BILIRUB SERPL-MCNC: 0.7 MG/DL
BUN SERPL-MCNC: 29 MG/DL
CALCIUM SERPL-MCNC: 9.3 MG/DL
CHLORIDE SERPL-SCNC: 98 MMOL/L
CHOLEST SERPL-MCNC: 94 MG/DL
CO2 SERPL-SCNC: 25 MMOL/L
CREAT SERPL-MCNC: 1.59 MG/DL
EGFRCR SERPLBLD CKD-EPI 2021: 32 ML/MIN/1.73M2
EOSINOPHIL # BLD AUTO: 0.12 K/UL
EOSINOPHIL NFR BLD AUTO: 2 %
ESTIMATED AVERAGE GLUCOSE: 123 MG/DL
FOLATE SERPL-MCNC: 8.7 NG/ML
GLUCOSE SERPL-MCNC: 97 MG/DL
HBA1C MFR BLD HPLC: 5.9 %
HCT VFR BLD CALC: 32.3 %
HDLC SERPL-MCNC: 55 MG/DL
HGB BLD-MCNC: 9.5 G/DL
IMM GRANULOCYTES NFR BLD AUTO: 0.2 %
LDLC SERPL-MCNC: 30 MG/DL
LYMPHOCYTES # BLD AUTO: 1.29 K/UL
LYMPHOCYTES NFR BLD AUTO: 21.7 %
MAN DIFF?: NORMAL
MCHC RBC-ENTMCNC: 23.8 PG
MCHC RBC-ENTMCNC: 29.4 G/DL
MCV RBC AUTO: 81 FL
MONOCYTES # BLD AUTO: 0.45 K/UL
MONOCYTES NFR BLD AUTO: 7.6 %
NEUTROPHILS # BLD AUTO: 4.04 K/UL
NEUTROPHILS NFR BLD AUTO: 68 %
NONHDLC SERPL-MCNC: 40 MG/DL
PLATELET # BLD AUTO: 286 K/UL
POTASSIUM SERPL-SCNC: 3.6 MMOL/L
PROT SERPL-MCNC: 7 G/DL
RBC # BLD: 3.99 M/UL
RBC # FLD: 19.5 %
SODIUM SERPL-SCNC: 138 MMOL/L
T3 SERPL-MCNC: 77 NG/DL
T3FREE SERPL-MCNC: 2.16 PG/ML
T3RU NFR SERPL: 1.1 TBI
T4 FREE SERPL-MCNC: 1.1 NG/DL
T4 SERPL-MCNC: 6.4 UG/DL
TRIGL SERPL-MCNC: 31 MG/DL
TSH SERPL-ACNC: 2.8 UIU/ML
VIT B12 SERPL-MCNC: 1267 PG/ML
WBC # FLD AUTO: 5.94 K/UL